# Patient Record
Sex: FEMALE | Race: WHITE | NOT HISPANIC OR LATINO | Employment: OTHER | URBAN - METROPOLITAN AREA
[De-identification: names, ages, dates, MRNs, and addresses within clinical notes are randomized per-mention and may not be internally consistent; named-entity substitution may affect disease eponyms.]

---

## 2021-01-15 DIAGNOSIS — Z23 NEED FOR VACCINATION: ICD-10-CM

## 2024-08-14 ENCOUNTER — APPOINTMENT (OUTPATIENT)
Dept: RADIOLOGY | Facility: MEDICAL CENTER | Age: 74
DRG: 377 | End: 2024-08-14
Attending: STUDENT IN AN ORGANIZED HEALTH CARE EDUCATION/TRAINING PROGRAM
Payer: MEDICARE

## 2024-08-14 ENCOUNTER — HOSPITAL ENCOUNTER (INPATIENT)
Facility: MEDICAL CENTER | Age: 74
LOS: 2 days | DRG: 377 | End: 2024-08-16
Attending: STUDENT IN AN ORGANIZED HEALTH CARE EDUCATION/TRAINING PROGRAM | Admitting: STUDENT IN AN ORGANIZED HEALTH CARE EDUCATION/TRAINING PROGRAM
Payer: MEDICARE

## 2024-08-14 DIAGNOSIS — D64.9 ANEMIA, UNSPECIFIED TYPE: ICD-10-CM

## 2024-08-14 DIAGNOSIS — R11.2 NAUSEA AND VOMITING, UNSPECIFIED VOMITING TYPE: ICD-10-CM

## 2024-08-14 DIAGNOSIS — R19.7 DIARRHEA, UNSPECIFIED TYPE: ICD-10-CM

## 2024-08-14 DIAGNOSIS — K27.9 PUD (PEPTIC ULCER DISEASE): ICD-10-CM

## 2024-08-14 DIAGNOSIS — D50.0 IRON DEFICIENCY ANEMIA DUE TO CHRONIC BLOOD LOSS: Primary | ICD-10-CM

## 2024-08-14 DIAGNOSIS — R10.13 EPIGASTRIC ABDOMINAL PAIN: ICD-10-CM

## 2024-08-14 PROBLEM — E86.0 DEHYDRATION: Status: ACTIVE | Noted: 2024-08-14

## 2024-08-14 PROBLEM — D50.9 IRON DEFICIENCY ANEMIA: Status: ACTIVE | Noted: 2024-08-14

## 2024-08-14 PROBLEM — E87.6 HYPOKALEMIA: Status: ACTIVE | Noted: 2024-08-14

## 2024-08-14 PROBLEM — E43 SEVERE PROTEIN-CALORIE MALNUTRITION (HCC): Status: ACTIVE | Noted: 2024-08-14

## 2024-08-14 PROBLEM — D62 ACUTE BLOOD LOSS ANEMIA: Status: ACTIVE | Noted: 2024-08-14

## 2024-08-14 PROBLEM — K92.2 ACUTE GI BLEEDING: Status: ACTIVE | Noted: 2024-08-14

## 2024-08-14 LAB
ALBUMIN SERPL BCP-MCNC: 3.4 G/DL (ref 3.2–4.9)
ALBUMIN/GLOB SERPL: 1.9 G/DL
ALP SERPL-CCNC: 69 U/L (ref 30–99)
ALT SERPL-CCNC: 16 U/L (ref 2–50)
AMORPH CRY #/AREA URNS HPF: PRESENT /HPF
ANION GAP SERPL CALC-SCNC: 12 MMOL/L (ref 7–16)
ANISOCYTOSIS BLD QL SMEAR: ABNORMAL
APPEARANCE UR: ABNORMAL
AST SERPL-CCNC: 18 U/L (ref 12–45)
BACTERIA #/AREA URNS HPF: NEGATIVE /HPF
BASOPHILS # BLD AUTO: 0.4 % (ref 0–1.8)
BASOPHILS # BLD: 0.04 K/UL (ref 0–0.12)
BILIRUB SERPL-MCNC: 0.4 MG/DL (ref 0.1–1.5)
BILIRUB UR QL STRIP.AUTO: NEGATIVE
BUN SERPL-MCNC: 9 MG/DL (ref 8–22)
CALCIUM ALBUM COR SERPL-MCNC: 9.4 MG/DL (ref 8.5–10.5)
CALCIUM SERPL-MCNC: 8.9 MG/DL (ref 8.5–10.5)
CHLORIDE SERPL-SCNC: 101 MMOL/L (ref 96–112)
CK SERPL-CCNC: 128 U/L (ref 0–154)
CO2 SERPL-SCNC: 27 MMOL/L (ref 20–33)
COLOR UR: YELLOW
COMMENT 1642: NORMAL
CREAT SERPL-MCNC: 0.41 MG/DL (ref 0.5–1.4)
DACRYOCYTES BLD QL SMEAR: NORMAL
EKG IMPRESSION: NORMAL
EKG IMPRESSION: NORMAL
EOSINOPHIL # BLD AUTO: 0 K/UL (ref 0–0.51)
EOSINOPHIL NFR BLD: 0 % (ref 0–6.9)
EPI CELLS #/AREA URNS HPF: NORMAL /HPF
ERYTHROCYTE [DISTWIDTH] IN BLOOD BY AUTOMATED COUNT: 46 FL (ref 35.9–50)
FERRITIN SERPL-MCNC: 18.1 NG/ML (ref 10–291)
FOLATE SERPL-MCNC: 10.3 NG/ML
GFR SERPLBLD CREATININE-BSD FMLA CKD-EPI: 103 ML/MIN/1.73 M 2
GLOBULIN SER CALC-MCNC: 1.8 G/DL (ref 1.9–3.5)
GLUCOSE SERPL-MCNC: 115 MG/DL (ref 65–99)
GLUCOSE UR STRIP.AUTO-MCNC: NEGATIVE MG/DL
HCT VFR BLD AUTO: 28.5 % (ref 37–47)
HGB BLD-MCNC: 8.3 G/DL (ref 12–16)
HGB RETIC QN AUTO: 18.3 PG/CELL (ref 29–35)
HYALINE CASTS #/AREA URNS LPF: NORMAL /LPF
HYPOCHROMIA BLD QL SMEAR: ABNORMAL
IMM GRANULOCYTES # BLD AUTO: 0.03 K/UL (ref 0–0.11)
IMM GRANULOCYTES NFR BLD AUTO: 0.3 % (ref 0–0.9)
IMM RETICS NFR: 14.9 % (ref 2.6–16.1)
INR PPP: 1.1 (ref 0.87–1.13)
IRON SATN MFR SERPL: 6 % (ref 15–55)
IRON SERPL-MCNC: 17 UG/DL (ref 40–170)
KETONES UR STRIP.AUTO-MCNC: 15 MG/DL
LDH SERPL L TO P-CCNC: 332 U/L (ref 107–266)
LEUKOCYTE ESTERASE UR QL STRIP.AUTO: NEGATIVE
LIPASE SERPL-CCNC: 16 U/L (ref 11–82)
LYMPHOCYTES # BLD AUTO: 0.48 K/UL (ref 1–4.8)
LYMPHOCYTES NFR BLD: 5.3 % (ref 22–41)
MAGNESIUM SERPL-MCNC: 2.1 MG/DL (ref 1.5–2.5)
MCH RBC QN AUTO: 23 PG (ref 27–33)
MCHC RBC AUTO-ENTMCNC: 29.1 G/DL (ref 32.2–35.5)
MCV RBC AUTO: 78.9 FL (ref 81.4–97.8)
MICRO URNS: ABNORMAL
MICROCYTES BLD QL SMEAR: ABNORMAL
MONOCYTES # BLD AUTO: 0.74 K/UL (ref 0–0.85)
MONOCYTES NFR BLD AUTO: 8.1 % (ref 0–13.4)
MORPHOLOGY BLD-IMP: NORMAL
MUCOUS THREADS #/AREA URNS HPF: NORMAL /HPF
NEUTROPHILS # BLD AUTO: 7.79 K/UL (ref 1.82–7.42)
NEUTROPHILS NFR BLD: 85.9 % (ref 44–72)
NITRITE UR QL STRIP.AUTO: NEGATIVE
NRBC # BLD AUTO: 0 K/UL
NRBC BLD-RTO: 0 /100 WBC (ref 0–0.2)
OVALOCYTES BLD QL SMEAR: NORMAL
PH UR STRIP.AUTO: 8.5 [PH] (ref 5–8)
PLATELET # BLD AUTO: 393 K/UL (ref 164–446)
PLATELET BLD QL SMEAR: NORMAL
PMV BLD AUTO: 10.1 FL (ref 9–12.9)
POIKILOCYTOSIS BLD QL SMEAR: NORMAL
POTASSIUM SERPL-SCNC: 3.1 MMOL/L (ref 3.6–5.5)
PROT SERPL-MCNC: 5.2 G/DL (ref 6–8.2)
PROT UR QL STRIP: NEGATIVE MG/DL
PROTHROMBIN TIME: 14.3 SEC (ref 12–14.6)
RBC # BLD AUTO: 3.61 M/UL (ref 4.2–5.4)
RBC # URNS HPF: NORMAL /HPF
RBC BLD AUTO: PRESENT
RBC UR QL AUTO: NEGATIVE
RETICS # AUTO: 0.06 M/UL (ref 0.04–0.12)
RETICS/RBC NFR: 1.8 % (ref 0.8–2.6)
SODIUM SERPL-SCNC: 140 MMOL/L (ref 135–145)
SP GR UR STRIP.AUTO: 1.01
STOMATOCYTES BLD QL SMEAR: NORMAL
TIBC SERPL-MCNC: 288 UG/DL (ref 250–450)
TRANSFERRIN SERPL-MCNC: 229 MG/DL (ref 200–370)
TROPONIN T SERPL-MCNC: 45 NG/L (ref 6–19)
TROPONIN T SERPL-MCNC: 48 NG/L (ref 6–19)
UIBC SERPL-MCNC: 271 UG/DL (ref 110–370)
UROBILINOGEN UR STRIP.AUTO-MCNC: 0.2 MG/DL
WBC # BLD AUTO: 9.1 K/UL (ref 4.8–10.8)
WBC #/AREA URNS HPF: NORMAL /HPF
WBC TOXIC VACUOLES BLD QL SMEAR: NORMAL

## 2024-08-14 PROCEDURE — 84484 ASSAY OF TROPONIN QUANT: CPT | Mod: 91

## 2024-08-14 PROCEDURE — 36415 COLL VENOUS BLD VENIPUNCTURE: CPT

## 2024-08-14 PROCEDURE — 82728 ASSAY OF FERRITIN: CPT

## 2024-08-14 PROCEDURE — 700111 HCHG RX REV CODE 636 W/ 250 OVERRIDE (IP): Performed by: STUDENT IN AN ORGANIZED HEALTH CARE EDUCATION/TRAINING PROGRAM

## 2024-08-14 PROCEDURE — 700102 HCHG RX REV CODE 250 W/ 637 OVERRIDE(OP): Mod: JZ | Performed by: STUDENT IN AN ORGANIZED HEALTH CARE EDUCATION/TRAINING PROGRAM

## 2024-08-14 PROCEDURE — 700105 HCHG RX REV CODE 258: Performed by: STUDENT IN AN ORGANIZED HEALTH CARE EDUCATION/TRAINING PROGRAM

## 2024-08-14 PROCEDURE — 93005 ELECTROCARDIOGRAM TRACING: CPT | Performed by: STUDENT IN AN ORGANIZED HEALTH CARE EDUCATION/TRAINING PROGRAM

## 2024-08-14 PROCEDURE — 83550 IRON BINDING TEST: CPT

## 2024-08-14 PROCEDURE — 74177 CT ABD & PELVIS W/CONTRAST: CPT

## 2024-08-14 PROCEDURE — 700102 HCHG RX REV CODE 250 W/ 637 OVERRIDE(OP): Performed by: STUDENT IN AN ORGANIZED HEALTH CARE EDUCATION/TRAINING PROGRAM

## 2024-08-14 PROCEDURE — 85046 RETICYTE/HGB CONCENTRATE: CPT

## 2024-08-14 PROCEDURE — 700117 HCHG RX CONTRAST REV CODE 255: Performed by: STUDENT IN AN ORGANIZED HEALTH CARE EDUCATION/TRAINING PROGRAM

## 2024-08-14 PROCEDURE — 770020 HCHG ROOM/CARE - TELE (206)

## 2024-08-14 PROCEDURE — 85025 COMPLETE CBC W/AUTO DIFF WBC: CPT

## 2024-08-14 PROCEDURE — 83615 LACTATE (LD) (LDH) ENZYME: CPT

## 2024-08-14 PROCEDURE — 96374 THER/PROPH/DIAG INJ IV PUSH: CPT

## 2024-08-14 PROCEDURE — 85610 PROTHROMBIN TIME: CPT

## 2024-08-14 PROCEDURE — 82550 ASSAY OF CK (CPK): CPT

## 2024-08-14 PROCEDURE — 83540 ASSAY OF IRON: CPT

## 2024-08-14 PROCEDURE — 81001 URINALYSIS AUTO W/SCOPE: CPT

## 2024-08-14 PROCEDURE — 99497 ADVNCD CARE PLAN 30 MIN: CPT | Performed by: STUDENT IN AN ORGANIZED HEALTH CARE EDUCATION/TRAINING PROGRAM

## 2024-08-14 PROCEDURE — A9270 NON-COVERED ITEM OR SERVICE: HCPCS | Mod: JZ | Performed by: STUDENT IN AN ORGANIZED HEALTH CARE EDUCATION/TRAINING PROGRAM

## 2024-08-14 PROCEDURE — 83690 ASSAY OF LIPASE: CPT

## 2024-08-14 PROCEDURE — 96375 TX/PRO/DX INJ NEW DRUG ADDON: CPT

## 2024-08-14 PROCEDURE — 82746 ASSAY OF FOLIC ACID SERUM: CPT

## 2024-08-14 PROCEDURE — 84466 ASSAY OF TRANSFERRIN: CPT

## 2024-08-14 PROCEDURE — 83735 ASSAY OF MAGNESIUM: CPT

## 2024-08-14 PROCEDURE — 93005 ELECTROCARDIOGRAM TRACING: CPT

## 2024-08-14 PROCEDURE — 71045 X-RAY EXAM CHEST 1 VIEW: CPT

## 2024-08-14 PROCEDURE — 99223 1ST HOSP IP/OBS HIGH 75: CPT | Mod: 25,AI | Performed by: STUDENT IN AN ORGANIZED HEALTH CARE EDUCATION/TRAINING PROGRAM

## 2024-08-14 PROCEDURE — A9270 NON-COVERED ITEM OR SERVICE: HCPCS | Performed by: STUDENT IN AN ORGANIZED HEALTH CARE EDUCATION/TRAINING PROGRAM

## 2024-08-14 PROCEDURE — 80053 COMPREHEN METABOLIC PANEL: CPT

## 2024-08-14 PROCEDURE — 99285 EMERGENCY DEPT VISIT HI MDM: CPT

## 2024-08-14 RX ORDER — ASPIRIN 325 MG
325 TABLET ORAL ONCE
Status: DISCONTINUED | OUTPATIENT
Start: 2024-08-14 | End: 2024-08-14

## 2024-08-14 RX ORDER — FAMOTIDINE 20 MG/1
20 TABLET, FILM COATED ORAL ONCE
Status: COMPLETED | OUTPATIENT
Start: 2024-08-14 | End: 2024-08-14

## 2024-08-14 RX ORDER — FLUTICASONE PROPIONATE 50 MCG
1 SPRAY, SUSPENSION (ML) NASAL DAILY
COMMUNITY

## 2024-08-14 RX ORDER — POTASSIUM CHLORIDE 1500 MG/1
60 TABLET, EXTENDED RELEASE ORAL ONCE
Status: COMPLETED | OUTPATIENT
Start: 2024-08-14 | End: 2024-08-14

## 2024-08-14 RX ORDER — ONDANSETRON 4 MG/1
4 TABLET, ORALLY DISINTEGRATING ORAL EVERY 4 HOURS PRN
Status: DISCONTINUED | OUTPATIENT
Start: 2024-08-14 | End: 2024-08-16 | Stop reason: HOSPADM

## 2024-08-14 RX ORDER — MAGNESIUM SULFATE 1 G/100ML
1 INJECTION INTRAVENOUS ONCE
Status: COMPLETED | OUTPATIENT
Start: 2024-08-14 | End: 2024-08-15

## 2024-08-14 RX ORDER — ONDANSETRON 4 MG/1
4 TABLET, ORALLY DISINTEGRATING ORAL EVERY 8 HOURS PRN
COMMUNITY

## 2024-08-14 RX ORDER — PANTOPRAZOLE SODIUM 40 MG/1
40 TABLET, DELAYED RELEASE ORAL 2 TIMES DAILY
Status: ON HOLD | COMMUNITY
End: 2024-08-16

## 2024-08-14 RX ORDER — SUCRALFATE ORAL 1 G/10ML
10 SUSPENSION ORAL 4 TIMES DAILY
Status: ON HOLD | COMMUNITY
End: 2024-08-16

## 2024-08-14 RX ORDER — POTASSIUM CHLORIDE 1500 MG/1
40 TABLET, EXTENDED RELEASE ORAL ONCE
Status: COMPLETED | OUTPATIENT
Start: 2024-08-14 | End: 2024-08-14

## 2024-08-14 RX ORDER — ACETAMINOPHEN 325 MG/1
650 TABLET ORAL EVERY 6 HOURS PRN
Status: DISCONTINUED | OUTPATIENT
Start: 2024-08-14 | End: 2024-08-15

## 2024-08-14 RX ORDER — HYDROMORPHONE HYDROCHLORIDE 1 MG/ML
0.5 INJECTION, SOLUTION INTRAMUSCULAR; INTRAVENOUS; SUBCUTANEOUS ONCE
Status: COMPLETED | OUTPATIENT
Start: 2024-08-14 | End: 2024-08-14

## 2024-08-14 RX ORDER — FAMOTIDINE 20 MG/1
20 TABLET, FILM COATED ORAL DAILY
Status: ON HOLD | COMMUNITY
End: 2024-08-16

## 2024-08-14 RX ORDER — SODIUM CHLORIDE, SODIUM LACTATE, POTASSIUM CHLORIDE, AND CALCIUM CHLORIDE .6; .31; .03; .02 G/100ML; G/100ML; G/100ML; G/100ML
500 INJECTION, SOLUTION INTRAVENOUS
Status: DISCONTINUED | OUTPATIENT
Start: 2024-08-14 | End: 2024-08-16 | Stop reason: HOSPADM

## 2024-08-14 RX ORDER — SODIUM CHLORIDE, SODIUM LACTATE, POTASSIUM CHLORIDE, CALCIUM CHLORIDE 600; 310; 30; 20 MG/100ML; MG/100ML; MG/100ML; MG/100ML
INJECTION, SOLUTION INTRAVENOUS CONTINUOUS
Status: DISCONTINUED | OUTPATIENT
Start: 2024-08-14 | End: 2024-08-16 | Stop reason: HOSPADM

## 2024-08-14 RX ORDER — SUCRALFATE ORAL 1 G/10ML
1 SUSPENSION ORAL EVERY 6 HOURS
Status: DISCONTINUED | OUTPATIENT
Start: 2024-08-15 | End: 2024-08-16 | Stop reason: HOSPADM

## 2024-08-14 RX ORDER — SODIUM CHLORIDE, SODIUM LACTATE, POTASSIUM CHLORIDE, CALCIUM CHLORIDE 600; 310; 30; 20 MG/100ML; MG/100ML; MG/100ML; MG/100ML
1000 INJECTION, SOLUTION INTRAVENOUS ONCE
Status: COMPLETED | OUTPATIENT
Start: 2024-08-14 | End: 2024-08-14

## 2024-08-14 RX ORDER — FAMOTIDINE 20 MG/1
20 TABLET, FILM COATED ORAL 2 TIMES DAILY
Status: DISCONTINUED | OUTPATIENT
Start: 2024-08-14 | End: 2024-08-14

## 2024-08-14 RX ORDER — ONDANSETRON 2 MG/ML
4 INJECTION INTRAMUSCULAR; INTRAVENOUS EVERY 4 HOURS PRN
Status: DISCONTINUED | OUTPATIENT
Start: 2024-08-14 | End: 2024-08-16 | Stop reason: HOSPADM

## 2024-08-14 RX ORDER — FOLIC ACID 1 MG/1
1 TABLET ORAL 2 TIMES DAILY
Status: DISCONTINUED | OUTPATIENT
Start: 2024-08-14 | End: 2024-08-16 | Stop reason: HOSPADM

## 2024-08-14 RX ORDER — UREA 10 %
1000 LOTION (ML) TOPICAL DAILY
Status: DISCONTINUED | OUTPATIENT
Start: 2024-08-15 | End: 2024-08-16 | Stop reason: HOSPADM

## 2024-08-14 RX ORDER — PANTOPRAZOLE SODIUM 40 MG/10ML
40 INJECTION, POWDER, LYOPHILIZED, FOR SOLUTION INTRAVENOUS ONCE
Status: COMPLETED | OUTPATIENT
Start: 2024-08-14 | End: 2024-08-14

## 2024-08-14 RX ORDER — LABETALOL HYDROCHLORIDE 5 MG/ML
10 INJECTION, SOLUTION INTRAVENOUS EVERY 4 HOURS PRN
Status: DISCONTINUED | OUTPATIENT
Start: 2024-08-14 | End: 2024-08-16 | Stop reason: HOSPADM

## 2024-08-14 RX ORDER — ONDANSETRON 2 MG/ML
4 INJECTION INTRAMUSCULAR; INTRAVENOUS ONCE
Status: COMPLETED | OUTPATIENT
Start: 2024-08-14 | End: 2024-08-14

## 2024-08-14 RX ADMIN — FENTANYL CITRATE 25 MCG: 50 INJECTION, SOLUTION INTRAMUSCULAR; INTRAVENOUS at 16:34

## 2024-08-14 RX ADMIN — POTASSIUM CHLORIDE 40 MEQ: 1500 TABLET, EXTENDED RELEASE ORAL at 17:28

## 2024-08-14 RX ADMIN — SODIUM CHLORIDE, POTASSIUM CHLORIDE, SODIUM LACTATE AND CALCIUM CHLORIDE: 600; 310; 30; 20 INJECTION, SOLUTION INTRAVENOUS at 20:36

## 2024-08-14 RX ADMIN — FOLIC ACID 1 MG: 1 TABLET ORAL at 23:49

## 2024-08-14 RX ADMIN — PANTOPRAZOLE SODIUM 40 MG: 40 INJECTION, POWDER, FOR SOLUTION INTRAVENOUS at 17:28

## 2024-08-14 RX ADMIN — ONDANSETRON 4 MG: 2 INJECTION INTRAMUSCULAR; INTRAVENOUS at 16:33

## 2024-08-14 RX ADMIN — SODIUM CHLORIDE, POTASSIUM CHLORIDE, SODIUM LACTATE AND CALCIUM CHLORIDE 1000 ML: 600; 310; 30; 20 INJECTION, SOLUTION INTRAVENOUS at 16:34

## 2024-08-14 RX ADMIN — POTASSIUM CHLORIDE 60 MEQ: 1500 TABLET, EXTENDED RELEASE ORAL at 23:49

## 2024-08-14 RX ADMIN — HYDROMORPHONE HYDROCHLORIDE 0.5 MG: 1 INJECTION, SOLUTION INTRAMUSCULAR; INTRAVENOUS; SUBCUTANEOUS at 18:06

## 2024-08-14 RX ADMIN — PANTOPRAZOLE SODIUM 8 MG/HR: 40 INJECTION, POWDER, FOR SOLUTION INTRAVENOUS at 20:37

## 2024-08-14 RX ADMIN — FAMOTIDINE 20 MG: 20 TABLET, FILM COATED ORAL at 16:34

## 2024-08-14 RX ADMIN — IOHEXOL 80 ML: 350 INJECTION, SOLUTION INTRAVENOUS at 19:00

## 2024-08-14 RX ADMIN — SUCRALFATE 1 G: 1 SUSPENSION ORAL at 23:49

## 2024-08-14 SDOH — ECONOMIC STABILITY: TRANSPORTATION INSECURITY
IN THE PAST 12 MONTHS, HAS LACK OF RELIABLE TRANSPORTATION KEPT YOU FROM MEDICAL APPOINTMENTS, MEETINGS, WORK OR FROM GETTING THINGS NEEDED FOR DAILY LIVING?: NO

## 2024-08-14 SDOH — ECONOMIC STABILITY: TRANSPORTATION INSECURITY
IN THE PAST 12 MONTHS, HAS THE LACK OF TRANSPORTATION KEPT YOU FROM MEDICAL APPOINTMENTS OR FROM GETTING MEDICATIONS?: NO

## 2024-08-14 ASSESSMENT — LIFESTYLE VARIABLES
CONSUMPTION TOTAL: NEGATIVE
ON A TYPICAL DAY WHEN YOU DRINK ALCOHOL HOW MANY DRINKS DO YOU HAVE: 0
HOW MANY TIMES IN THE PAST YEAR HAVE YOU HAD 5 OR MORE DRINKS IN A DAY: 0
ALCOHOL_USE: NO
TOTAL SCORE: 0
TOTAL SCORE: 0
SUBSTANCE_ABUSE: 0
TOTAL SCORE: 0
HAVE YOU EVER FELT YOU SHOULD CUT DOWN ON YOUR DRINKING: NO
AVERAGE NUMBER OF DAYS PER WEEK YOU HAVE A DRINK CONTAINING ALCOHOL: 0
EVER HAD A DRINK FIRST THING IN THE MORNING TO STEADY YOUR NERVES TO GET RID OF A HANGOVER: NO
EVER FELT BAD OR GUILTY ABOUT YOUR DRINKING: NO
HAVE PEOPLE ANNOYED YOU BY CRITICIZING YOUR DRINKING: NO

## 2024-08-14 ASSESSMENT — ENCOUNTER SYMPTOMS
CHILLS: 0
ABDOMINAL PAIN: 1
NAUSEA: 1
SHORTNESS OF BREATH: 0
FOCAL WEAKNESS: 1
MYALGIAS: 0
VOMITING: 1
BRUISES/BLEEDS EASILY: 1
DEPRESSION: 0
BACK PAIN: 0
PALPITATIONS: 0
DOUBLE VISION: 0
BLURRED VISION: 0
BLOOD IN STOOL: 1
HEARTBURN: 1
FEVER: 0
HEADACHES: 0
COUGH: 0
DIZZINESS: 0

## 2024-08-14 ASSESSMENT — COGNITIVE AND FUNCTIONAL STATUS - GENERAL
MOBILITY SCORE: 22
CLIMB 3 TO 5 STEPS WITH RAILING: A LITTLE
DAILY ACTIVITIY SCORE: 23
DRESSING REGULAR LOWER BODY CLOTHING: A LITTLE
SUGGESTED CMS G CODE MODIFIER MOBILITY: CJ
SUGGESTED CMS G CODE MODIFIER DAILY ACTIVITY: CI
WALKING IN HOSPITAL ROOM: A LITTLE

## 2024-08-14 ASSESSMENT — SOCIAL DETERMINANTS OF HEALTH (SDOH)
WITHIN THE PAST 12 MONTHS, YOU WORRIED THAT YOUR FOOD WOULD RUN OUT BEFORE YOU GOT THE MONEY TO BUY MORE: NEVER TRUE
WITHIN THE LAST YEAR, HAVE YOU BEEN KICKED, HIT, SLAPPED, OR OTHERWISE PHYSICALLY HURT BY YOUR PARTNER OR EX-PARTNER?: NO
WITHIN THE LAST YEAR, HAVE YOU BEEN AFRAID OF YOUR PARTNER OR EX-PARTNER?: NO
WITHIN THE PAST 12 MONTHS, THE FOOD YOU BOUGHT JUST DIDN'T LAST AND YOU DIDN'T HAVE MONEY TO GET MORE: NEVER TRUE
WITHIN THE LAST YEAR, HAVE TO BEEN RAPED OR FORCED TO HAVE ANY KIND OF SEXUAL ACTIVITY BY YOUR PARTNER OR EX-PARTNER?: NO
IN THE PAST 12 MONTHS, HAS THE ELECTRIC, GAS, OIL, OR WATER COMPANY THREATENED TO SHUT OFF SERVICE IN YOUR HOME?: NO
WITHIN THE LAST YEAR, HAVE YOU BEEN HUMILIATED OR EMOTIONALLY ABUSED IN OTHER WAYS BY YOUR PARTNER OR EX-PARTNER?: NO

## 2024-08-14 ASSESSMENT — FIBROSIS 4 INDEX: FIB4 SCORE: 0.84

## 2024-08-14 ASSESSMENT — PATIENT HEALTH QUESTIONNAIRE - PHQ9
SUM OF ALL RESPONSES TO PHQ9 QUESTIONS 1 AND 2: 0
2. FEELING DOWN, DEPRESSED, IRRITABLE, OR HOPELESS: NOT AT ALL
1. LITTLE INTEREST OR PLEASURE IN DOING THINGS: NOT AT ALL

## 2024-08-14 NOTE — ED PROVIDER NOTES
ED Provider Note    CHIEF COMPLAINT  Chief Complaint   Patient presents with    Abdominal Pain    Vomiting       EXTERNAL RECORDS REVIEWED  Outpatient Notes patient was seen in the emergency department at [facility] yesterday for abdominal bloating and epigastric abdominal pain nausea and vomiting.  She had a laboratory workup remarkable for baseline anemia, hypokalemia but unremarkable LFTs and lipase and was ultimately discharged.  An abdominal x-ray demonstrated no evidence of obstruction or intra-abdominal free air.  Did not appear she was complaining of chest pain at that time.    HPI/ROS  LIMITATION TO HISTORY   Select: : None      Penelope Prince is a 73 y.o. female who presents to the emergency department for evaluation of ongoing epigastric abdominal pain also in her right upper quadrant which has persisted for the last week, steadily worsening.  She states she underwent an EGD 1 week ago which showed a persistent ulcer in her stomach.  She states she has taken her medications as prescribed after the procedure.  She reports that the pain has persisted and worsened since yesterday and is now migrating into her chest described as a burning sensation.  She reports nausea and vomiting.  She reports constipation and has not had a bowel movement in the last 24 hours.  She is passing gas.  She denies hematemesis or blood in her stool.  She does not take a blood thinner.    PAST MEDICAL HISTORY   has a past medical history of Allergy, Arthritis, GERD (gastroesophageal reflux disease), and Hematuria.    SURGICAL HISTORY   has a past surgical history that includes cataract extraction with iol and ganglion excision (10/19/2009).    FAMILY HISTORY  Family History   Problem Relation Age of Onset    Heart Disease Father     Diabetes Other     Cancer Maternal Grandmother     Cancer Maternal Grandfather     Heart Disease Paternal Grandmother     Heart Disease Paternal Grandfather     Lung Disease  Sister        SOCIAL HISTORY  Social History     Tobacco Use    Smoking status: Never    Smokeless tobacco: Never   Substance and Sexual Activity    Alcohol use: No     Comment: rare    Drug use: No    Sexual activity: Not on file       CURRENT MEDICATIONS  Home Medications       Reviewed by Bri Bishop R.N. (Registered Nurse) on 08/14/24 at 1527  Med List Status: Not Addressed     Medication Last Dose Status   EXCEDRIN 250-250-65 MG TABS  Active   hydrocodone-acetaminophen (NORCO) 5-325 MG TABS per tablet  Active   Omeprazole (EQL OMEPRAZOLE) 20 MG TBEC  Active                    ALLERGIES  Allergies   Allergen Reactions    Aleve [Prenat W-O Y-Je-Nbevite-Fa-Dha]      drowsiness    Food      Banana, onion, carrot       PHYSICAL EXAM  VITAL SIGNS: /57   Pulse 80   Temp 36.8 °C (98.3 °F) (Temporal)   Resp 18   Ht 1.524 m (5')   Wt 43.5 kg (96 lb)   SpO2 95%   BMI 18.75 kg/m²    Constitutional: Anxious, uncomfortable appearing clutching abdomen  HEENT: Atraumatic, normocephalic, pupils are equal round reactive to light, nose normal, mouth shows m dry oist mucous membranes  Neck: Supple, no JVD, no tracheal deviation  Cardiovascular: Regular rate and rhythm, no murmur, rub or gallop, 2+ pulses peripherally x4  Thorax & Lungs: No respiratory distress, no wheezes, rales or rhonchi, no chest wall tenderness.  GI: Soft, epigastric and right upper quadrant tenderness with guarding  Skin: Warm, dry, no acute rash or lesion  Musculoskeletal: Moving all extremities, no acute deformity, no edema, no tenderness  Neurologic: A&Ox3, at baseline mentation, cranial nerves II through XII are grossly intact, no sensory deficit, no ataxia  Psychiatric: Appropriate affect for situation at this time          EKG/LABS  Labs Reviewed   URINALYSIS - Abnormal; Notable for the following components:       Result Value    Character Turbid (*)     Ph 8.5 (*)     Ketones 15 (*)     All other components within normal limits   CBC  WITH DIFFERENTIAL - Abnormal; Notable for the following components:    RBC 3.61 (*)     Hemoglobin 8.3 (*)     Hematocrit 28.5 (*)     MCV 78.9 (*)     MCH 23.0 (*)     MCHC 29.1 (*)     Neutrophils-Polys 85.90 (*)     Lymphocytes 5.30 (*)     Neutrophils (Absolute) 7.79 (*)     Lymphs (Absolute) 0.48 (*)     Microcytosis 2+ (*)     All other components within normal limits   COMP METABOLIC PANEL - Abnormal; Notable for the following components:    Potassium 3.1 (*)     Glucose 115 (*)     Creatinine 0.41 (*)     Total Protein 5.2 (*)     Globulin 1.8 (*)     All other components within normal limits   TROPONIN - Abnormal; Notable for the following components:    Troponin T 48 (*)     All other components within normal limits   LIPASE   URINE MICROSCOPIC (W/UA)   ESTIMATED GFR   PLATELET ESTIMATE   MORPHOLOGY   PERIPHERAL SMEAR REVIEW   DIFFERENTIAL COMMENT   TROPONIN     Results for orders placed or performed during the hospital encounter of 24   EKG   Result Value Ref Range    Report       [facility] Emergency Dept.    Test Date:  2024  Pt Name:    HORACE MERCADO                 Department: ER  MRN:        [id number]                      Room:       BL 21 H  Gender:     Female                       Technician: [id number]  :        [id number]                   Requested By:ER TRIAGE PROTOCOL  Order #:    [id number]                    Reading MD: Vamshi Braswell    Measurements  Intervals                                Axis  Rate:       71                           P:          45  WI:         109                          QRS:        22  QRSD:       106                          T:          33  QT:         398  QTc:        433    Interpretive Statements  Sinus rhythm  Short WI interval  Low voltage, extremity leads  Compared to ECG 10/15/2009 10:38:43  Short WI interval now present  Electronically Signed On 2024 16:10:34 PDT by Vamshi Braswell         I have independently interpreted this  EKG    RADIOLOGY/PROCEDURES   I have independently interpreted the diagnostic imaging associated with this visit and am waiting the final reading from the radiologist.   My preliminary interpretation is as follows: CT scan with no intra-abdominal free air or evidence of bowel obstruction    Radiologist interpretation:  CT-ABDOMEN-PELVIS WITH   Final Result      Abnormal mucosal enhancement and probable wall thickening of the gastric antrum and proximal duodenum with possible outpouching/ulcers suggesting gastroduodenitis/peptic ulcer disease. Correlate with endoscopy.      No definite free air      Colonic diverticulosis.      DX-CHEST-PORTABLE (1 VIEW)   Final Result      No acute cardiac or pulmonary abnormalities are identified.          COURSE & MEDICAL DECISION MAKING    ASSESSMENT, COURSE AND PLAN  Care Narrative:     Patient presents to the emergency department for evaluation of ongoing epigastric and right upper quadrant abdominal pain, constipation and now migration of her pain to her chest.  She describes it as a burning discomfort in her chest that is nonexertional in nature.  Does not sound typical of ACS however will screen for such with EKG and troponin.  I am chiefly concerned for development of bowel perforation given her recent instrumentation and known peptic ulcer disease however she is nonperitoneal.  Given her age, persistence of her symptoms, bounce back within 24 hours to the emergency department we will proceed with CT scan abdomen pelvis to further assess.  Will repeat laboratory workup to screen for any worsening anemia, significant electrolyte abnormality given her hypokalemia found yesterday, kidney or liver dysfunction.  Patient's troponin initially was found to be slightly elevated though on recheck she denies any ongoing chest pain.  Recheck demonstrates a downtrend and serial EKGs were obtained with no acute ischemic findings or changes.  Suspect demand ischemia from poor p.o. intake  and dehydration and at this point will not initiate aspirin or heparin therapy given concern for bleeding with worsening anemia today including a 1 point hemoglobin drop from laboratory assay yesterday.  CT scan ultimately without evidence of bowel perforation, obstruction or intra-abdominal abscess though does demonstrate findings suggestive of peptic ulcer disease.  Given the significance of her pain, hemoglobin drop and concern for active bleeding will plan for admission to the hospital for consideration of repeat EGD.  Case discussed with Dr. Giang, hospitalist who accepts patient for admission        Hydration: Based on the patient's presentation of Acute Vomiting and Dehydration the patient was given IV fluids. IV Hydration was used because oral hydration was not adequate alone. Upon recheck following hydration, the patient was improved.          ADDITIONAL PROBLEMS MANAGED  None    DISPOSITION AND DISCUSSIONS  I have discussed management of the patient with the following physicians and FRANKY's: Hospitalist as above    Decision tools and prescription drugs considered including, but not limited to: Pain Medications Pepcid, Dilaudid, fentanyl .    FINAL DIAGNOSIS  1. Nausea and vomiting, unspecified vomiting type    2. Epigastric abdominal pain    3. Anemia, unspecified type         Electronically signed by: Vamshi Braswell M.D., 8/14/2024 4:05 PM

## 2024-08-14 NOTE — ED TRIAGE NOTES
"Penelope Prince  73 y.o. female    Chief Complaint   Patient presents with    Abdominal Pain    Vomiting     Pt arrives with complaints of generalized abd pain and L sided belly pain that began after pt had endoscopy on 8/7 for ulcer. Pt states she started vomiting today. Denies blood in vomit. Last BM yesterday- reports it is \"hard like marbles.\" Denies fevers. Pt reports pain is radiating up to chest    Pt went to [facility] last night for eval.     Pt states that she wants to die. Reports she started feeling suicidal last night but denies plan  Vitals:    08/14/24 1512   BP: 109/57   Pulse: 80   Resp: 18   Temp: 36.8 °C (98.3 °F)   SpO2: 95%       Triage process explained to patient, apologized for wait time, and returned to lobby.  Pt informed to notify staff of any change in condition.     "

## 2024-08-15 PROBLEM — D50.0 IRON DEFICIENCY ANEMIA DUE TO CHRONIC BLOOD LOSS: Status: ACTIVE | Noted: 2024-08-14

## 2024-08-15 LAB
ALBUMIN SERPL BCP-MCNC: 3 G/DL (ref 3.2–4.9)
ALBUMIN/GLOB SERPL: 2 G/DL
ALP SERPL-CCNC: 63 U/L (ref 30–99)
ALT SERPL-CCNC: 12 U/L (ref 2–50)
ANION GAP SERPL CALC-SCNC: 6 MMOL/L (ref 7–16)
AST SERPL-CCNC: 13 U/L (ref 12–45)
BASOPHILS # BLD AUTO: 0.5 % (ref 0–1.8)
BASOPHILS # BLD: 0.04 K/UL (ref 0–0.12)
BILIRUB SERPL-MCNC: 0.3 MG/DL (ref 0.1–1.5)
BUN SERPL-MCNC: 6 MG/DL (ref 8–22)
C DIFF DNA SPEC QL NAA+PROBE: NEGATIVE
C DIFF TOX GENS STL QL NAA+PROBE: NEGATIVE
CALCIUM ALBUM COR SERPL-MCNC: 8.5 MG/DL (ref 8.5–10.5)
CALCIUM SERPL-MCNC: 7.7 MG/DL (ref 8.5–10.5)
CHLORIDE SERPL-SCNC: 104 MMOL/L (ref 96–112)
CO2 SERPL-SCNC: 24 MMOL/L (ref 20–33)
CREAT SERPL-MCNC: 0.38 MG/DL (ref 0.5–1.4)
EOSINOPHIL # BLD AUTO: 0.03 K/UL (ref 0–0.51)
EOSINOPHIL NFR BLD: 0.4 % (ref 0–6.9)
ERYTHROCYTE [DISTWIDTH] IN BLOOD BY AUTOMATED COUNT: 46.1 FL (ref 35.9–50)
GFR SERPLBLD CREATININE-BSD FMLA CKD-EPI: 105 ML/MIN/1.73 M 2
GLOBULIN SER CALC-MCNC: 1.5 G/DL (ref 1.9–3.5)
GLUCOSE SERPL-MCNC: 121 MG/DL (ref 65–99)
HCT VFR BLD AUTO: 24.9 % (ref 37–47)
HCT VFR BLD AUTO: 25.1 % (ref 37–47)
HCT VFR BLD AUTO: 26 % (ref 37–47)
HEMOCCULT STL QL: POSITIVE
HGB BLD-MCNC: 7.1 G/DL (ref 12–16)
HGB BLD-MCNC: 7.2 G/DL (ref 12–16)
HGB BLD-MCNC: 7.3 G/DL (ref 12–16)
HGB RETIC QN AUTO: 17.3 PG/CELL (ref 29–35)
IMM GRANULOCYTES # BLD AUTO: 0.03 K/UL (ref 0–0.11)
IMM GRANULOCYTES NFR BLD AUTO: 0.4 % (ref 0–0.9)
IMM RETICS NFR: 13.8 % (ref 2.6–16.1)
IRON SATN MFR SERPL: 6 % (ref 15–55)
IRON SERPL-MCNC: 13 UG/DL (ref 40–170)
LYMPHOCYTES # BLD AUTO: 0.76 K/UL (ref 1–4.8)
LYMPHOCYTES NFR BLD: 10.1 % (ref 22–41)
MAGNESIUM SERPL-MCNC: 2.4 MG/DL (ref 1.5–2.5)
MCH RBC QN AUTO: 22.9 PG (ref 27–33)
MCHC RBC AUTO-ENTMCNC: 28.7 G/DL (ref 32.2–35.5)
MCV RBC AUTO: 79.7 FL (ref 81.4–97.8)
MONOCYTES # BLD AUTO: 0.57 K/UL (ref 0–0.85)
MONOCYTES NFR BLD AUTO: 7.6 % (ref 0–13.4)
NEUTROPHILS # BLD AUTO: 6.07 K/UL (ref 1.82–7.42)
NEUTROPHILS NFR BLD: 81 % (ref 44–72)
NRBC # BLD AUTO: 0 K/UL
NRBC BLD-RTO: 0 /100 WBC (ref 0–0.2)
PHOSPHATE SERPL-MCNC: 3 MG/DL (ref 2.5–4.5)
PLATELET # BLD AUTO: 347 K/UL (ref 164–446)
PMV BLD AUTO: 10.6 FL (ref 9–12.9)
POTASSIUM SERPL-SCNC: 3.7 MMOL/L (ref 3.6–5.5)
PROT SERPL-MCNC: 4.5 G/DL (ref 6–8.2)
RBC # BLD AUTO: 3.15 M/UL (ref 4.2–5.4)
RETICS # AUTO: 0.06 M/UL (ref 0.04–0.12)
RETICS/RBC NFR: 1.8 % (ref 0.8–2.6)
SODIUM SERPL-SCNC: 134 MMOL/L (ref 135–145)
TIBC SERPL-MCNC: 224 UG/DL (ref 250–450)
UIBC SERPL-MCNC: 211 UG/DL (ref 110–370)
VIT B12 SERPL-MCNC: 368 PG/ML (ref 211–911)
VIT B12 SERPL-MCNC: 671 PG/ML (ref 211–911)
VIT B12 SERPL-MCNC: 727 PG/ML (ref 211–911)
WBC # BLD AUTO: 7.5 K/UL (ref 4.8–10.8)

## 2024-08-15 PROCEDURE — 85014 HEMATOCRIT: CPT

## 2024-08-15 PROCEDURE — 36415 COLL VENOUS BLD VENIPUNCTURE: CPT

## 2024-08-15 PROCEDURE — A9270 NON-COVERED ITEM OR SERVICE: HCPCS | Mod: JZ | Performed by: STUDENT IN AN ORGANIZED HEALTH CARE EDUCATION/TRAINING PROGRAM

## 2024-08-15 PROCEDURE — 82272 OCCULT BLD FECES 1-3 TESTS: CPT

## 2024-08-15 PROCEDURE — 700111 HCHG RX REV CODE 636 W/ 250 OVERRIDE (IP): Performed by: STUDENT IN AN ORGANIZED HEALTH CARE EDUCATION/TRAINING PROGRAM

## 2024-08-15 PROCEDURE — 700105 HCHG RX REV CODE 258: Performed by: STUDENT IN AN ORGANIZED HEALTH CARE EDUCATION/TRAINING PROGRAM

## 2024-08-15 PROCEDURE — 80053 COMPREHEN METABOLIC PANEL: CPT

## 2024-08-15 PROCEDURE — 87493 C DIFF AMPLIFIED PROBE: CPT

## 2024-08-15 PROCEDURE — 82607 VITAMIN B-12: CPT | Mod: 91

## 2024-08-15 PROCEDURE — 84100 ASSAY OF PHOSPHORUS: CPT

## 2024-08-15 PROCEDURE — 85046 RETICYTE/HGB CONCENTRATE: CPT

## 2024-08-15 PROCEDURE — 99233 SBSQ HOSP IP/OBS HIGH 50: CPT | Performed by: STUDENT IN AN ORGANIZED HEALTH CARE EDUCATION/TRAINING PROGRAM

## 2024-08-15 PROCEDURE — 85025 COMPLETE CBC W/AUTO DIFF WBC: CPT

## 2024-08-15 PROCEDURE — 770020 HCHG ROOM/CARE - TELE (206)

## 2024-08-15 PROCEDURE — 700102 HCHG RX REV CODE 250 W/ 637 OVERRIDE(OP): Performed by: STUDENT IN AN ORGANIZED HEALTH CARE EDUCATION/TRAINING PROGRAM

## 2024-08-15 PROCEDURE — A9270 NON-COVERED ITEM OR SERVICE: HCPCS | Performed by: STUDENT IN AN ORGANIZED HEALTH CARE EDUCATION/TRAINING PROGRAM

## 2024-08-15 PROCEDURE — 83010 ASSAY OF HAPTOGLOBIN QUANT: CPT

## 2024-08-15 PROCEDURE — 85018 HEMOGLOBIN: CPT

## 2024-08-15 PROCEDURE — 700102 HCHG RX REV CODE 250 W/ 637 OVERRIDE(OP): Mod: JZ | Performed by: STUDENT IN AN ORGANIZED HEALTH CARE EDUCATION/TRAINING PROGRAM

## 2024-08-15 PROCEDURE — 99222 1ST HOSP IP/OBS MODERATE 55: CPT | Mod: 25 | Performed by: INTERNAL MEDICINE

## 2024-08-15 PROCEDURE — 83550 IRON BINDING TEST: CPT

## 2024-08-15 PROCEDURE — 83735 ASSAY OF MAGNESIUM: CPT

## 2024-08-15 PROCEDURE — 83540 ASSAY OF IRON: CPT

## 2024-08-15 RX ORDER — ACETAMINOPHEN 500 MG
1000 TABLET ORAL EVERY 6 HOURS
Status: DISCONTINUED | OUTPATIENT
Start: 2024-08-15 | End: 2024-08-16 | Stop reason: HOSPADM

## 2024-08-15 RX ORDER — LOPERAMIDE HCL 2 MG
2 CAPSULE ORAL 4 TIMES DAILY PRN
Status: DISCONTINUED | OUTPATIENT
Start: 2024-08-15 | End: 2024-08-16 | Stop reason: HOSPADM

## 2024-08-15 RX ORDER — OXYCODONE HYDROCHLORIDE 5 MG/1
5 TABLET ORAL
Status: DISCONTINUED | OUTPATIENT
Start: 2024-08-15 | End: 2024-08-16 | Stop reason: HOSPADM

## 2024-08-15 RX ORDER — MORPHINE SULFATE 4 MG/ML
2 INJECTION INTRAVENOUS
Status: DISCONTINUED | OUTPATIENT
Start: 2024-08-15 | End: 2024-08-16 | Stop reason: HOSPADM

## 2024-08-15 RX ORDER — ACETAMINOPHEN 325 MG/1
650 TABLET ORAL EVERY 6 HOURS PRN
Status: DISCONTINUED | OUTPATIENT
Start: 2024-08-15 | End: 2024-08-16 | Stop reason: HOSPADM

## 2024-08-15 RX ORDER — OXYCODONE HYDROCHLORIDE 5 MG/1
2.5 TABLET ORAL
Status: DISCONTINUED | OUTPATIENT
Start: 2024-08-15 | End: 2024-08-16 | Stop reason: HOSPADM

## 2024-08-15 RX ORDER — POTASSIUM CHLORIDE 1500 MG/1
40 TABLET, EXTENDED RELEASE ORAL ONCE
Status: COMPLETED | OUTPATIENT
Start: 2024-08-15 | End: 2024-08-15

## 2024-08-15 RX ORDER — ACETAMINOPHEN 500 MG
1000 TABLET ORAL EVERY 6 HOURS PRN
Status: DISCONTINUED | OUTPATIENT
Start: 2024-08-20 | End: 2024-08-16 | Stop reason: HOSPADM

## 2024-08-15 RX ORDER — PANTOPRAZOLE SODIUM 40 MG/10ML
40 INJECTION, POWDER, LYOPHILIZED, FOR SOLUTION INTRAVENOUS 2 TIMES DAILY
Status: DISCONTINUED | OUTPATIENT
Start: 2024-08-15 | End: 2024-08-16 | Stop reason: HOSPADM

## 2024-08-15 RX ADMIN — SUCRALFATE 1 G: 1 SUSPENSION ORAL at 05:05

## 2024-08-15 RX ADMIN — SUCRALFATE 1 G: 1 SUSPENSION ORAL at 12:00

## 2024-08-15 RX ADMIN — MAGNESIUM SULFATE IN DEXTROSE 1 G: 10 INJECTION, SOLUTION INTRAVENOUS at 00:03

## 2024-08-15 RX ADMIN — FOLIC ACID 1 MG: 1 TABLET ORAL at 05:05

## 2024-08-15 RX ADMIN — ACETAMINOPHEN 650 MG: 325 TABLET ORAL at 08:52

## 2024-08-15 RX ADMIN — PANTOPRAZOLE SODIUM 40 MG: 40 INJECTION, POWDER, FOR SOLUTION INTRAVENOUS at 08:39

## 2024-08-15 RX ADMIN — LOPERAMIDE HYDROCHLORIDE 2 MG: 2 CAPSULE ORAL at 13:46

## 2024-08-15 RX ADMIN — PANTOPRAZOLE SODIUM 8 MG/HR: 40 INJECTION, POWDER, FOR SOLUTION INTRAVENOUS at 05:48

## 2024-08-15 RX ADMIN — ACETAMINOPHEN 1000 MG: 500 TABLET ORAL at 17:44

## 2024-08-15 RX ADMIN — SUCRALFATE 1 G: 1 SUSPENSION ORAL at 17:44

## 2024-08-15 RX ADMIN — FOLIC ACID 1 MG: 1 TABLET ORAL at 17:44

## 2024-08-15 RX ADMIN — SODIUM CHLORIDE, POTASSIUM CHLORIDE, SODIUM LACTATE AND CALCIUM CHLORIDE: 600; 310; 30; 20 INJECTION, SOLUTION INTRAVENOUS at 05:49

## 2024-08-15 RX ADMIN — PANTOPRAZOLE SODIUM 40 MG: 40 INJECTION, POWDER, FOR SOLUTION INTRAVENOUS at 17:45

## 2024-08-15 RX ADMIN — POTASSIUM CHLORIDE 40 MEQ: 1500 TABLET, EXTENDED RELEASE ORAL at 08:39

## 2024-08-15 RX ADMIN — SODIUM CHLORIDE, POTASSIUM CHLORIDE, SODIUM LACTATE AND CALCIUM CHLORIDE: 600; 310; 30; 20 INJECTION, SOLUTION INTRAVENOUS at 18:33

## 2024-08-15 RX ADMIN — SODIUM CHLORIDE 250 MG: 9 INJECTION, SOLUTION INTRAVENOUS at 21:04

## 2024-08-15 RX ADMIN — CYANOCOBALAMIN TAB 500 MCG 1000 MCG: 500 TAB at 05:05

## 2024-08-15 RX ADMIN — ACETAMINOPHEN 1000 MG: 500 TABLET ORAL at 15:10

## 2024-08-15 ASSESSMENT — ENCOUNTER SYMPTOMS
WEIGHT LOSS: 1
RESPIRATORY NEGATIVE: 1
EYES NEGATIVE: 1
FEVER: 0
CARDIOVASCULAR NEGATIVE: 1
MUSCULOSKELETAL NEGATIVE: 1
VOMITING: 1
COUGH: 0
DIZZINESS: 0
CHILLS: 0
ABDOMINAL PAIN: 1
DIARRHEA: 1
NEUROLOGICAL NEGATIVE: 1
BLOOD IN STOOL: 0
HEADACHES: 0
SHORTNESS OF BREATH: 0
NAUSEA: 0
VOMITING: 0
PALPITATIONS: 0
MYALGIAS: 0

## 2024-08-15 ASSESSMENT — PAIN DESCRIPTION - PAIN TYPE
TYPE: ACUTE PAIN
TYPE: ACUTE PAIN

## 2024-08-15 ASSESSMENT — FIBROSIS 4 INDEX: FIB4 SCORE: 0.79

## 2024-08-15 NOTE — PROGRESS NOTES
4 Eyes Skin Assessment Completed by ANALI Ramirez and ANALI Escobedo.    Head WDL  Ears WDL  Nose WDL  Mouth WDL  Neck WDL  Breast/Chest WDL  Shoulder Blades WDL  Spine WDL  (R) Arm/Elbow/Hand Bruising, skin tear  (L) Arm/Elbow/Hand Bruising  Abdomen WDL  Groin WDL  Scrotum/Coccyx/Buttocks WDL  (R) Leg Scab and Bruising  (L) Leg WDL  (R) Heel/Foot/Toe WDL  (L) Heel/Foot/Toe WDL          Devices In Places Tele Box and Pulse Ox      Interventions In Place Pillows    Possible Skin Injury No    Pictures Uploaded Into Epic Yes  Wound Consult Placed N/A  RN Wound Prevention Protocol Ordered Yes

## 2024-08-15 NOTE — CARE PLAN
The patient is Stable - Low risk of patient condition declining or worsening    Shift Goals  Clinical Goals: (P) monitor for s/s of bleeds, labs, VS  Patient Goals: (P) comfort, rest, pain relief  Family Goals: (P) updates    Progress made toward(s) clinical / shift goals: Stool collected for occult blood, which was positive. Continuing to monitor H&H and to notify MD when less than seven.       Problem: Pain - Standard  Goal: Alleviation of pain or a reduction in pain to the patient’s comfort goal  Outcome: Progressing     Problem: Knowledge Deficit - Standard  Goal: Patient and family/care givers will demonstrate understanding of plan of care, disease process/condition, diagnostic tests and medications  Outcome: Progressing     Problem: Psychosocial  Goal: Patient's ability to identify and develop effective coping behaviors will improve  Outcome: Progressing     Problem: Hemodynamics  Goal: Patient's hemodynamics, fluid balance and neurologic status will be stable or improve  Outcome: Progressing     Problem: Fluid Volume  Goal: Fluid volume balance will be maintained  Outcome: Progressing     Problem: Urinary - Renal Perfusion  Goal: Ability to achieve and maintain adequate renal perfusion and functioning will improve  Outcome: Progressing     Problem: Respiratory  Goal: Patient will achieve/maintain optimum respiratory ventilation and gas exchange  Outcome: Progressing     Problem: Fall Risk  Goal: Patient will remain free from falls  Outcome: Progressing

## 2024-08-15 NOTE — DIETARY
Nutrition services: Day 1 of admit.  Penelope Prince is a 73 y.o. female with admitting DX of Acute GIB.     Pt seen for low BMI and poor PO/wt loss per admit screen, malnutrition screening tool score of 2. RD unable to see pt at this time for interview/nutrition focused physical exam and intervention limited due to clear liquid diet.        Assessment:  Height: 152.4 cm (5')  Weight: 38 kg (83 lb 12.4 oz)  Body mass index is 16.36 kg/m²., BMI classification: Underweight.   Diet/Intake: Clear Liquids.     Evaluation:   Pertinent Labs: Na+ 134.   Pertinent Meds: Vit B12, Folic Acid, LR @ 83 ml/hr, Imodium, Zofran PRN.   Per chart review: No recent wts to assess trends.   Skin: skin tear to right upper arm, no pressure ulcers noted.   GI consult pending.       Malnutrition Risk: Limited information at this time.     Recommendations/Plan:  Will add Ensure clear liquid supplement drink to help optimize PO intake while on clear liquids, changing to Ensure Plus as appropriate with diet advancement.    Encourage intake of ~ 50% or greater of meals.   Document intake of all meals,snacks, supplements  as % taken in ADL's to provide interdisciplinary communication across all shifts.   Monitor weight.  Nutrition rep will continue to see patient for ongoing meal and snack preferences.     RD to follow for adequate intake and interview as feasible.

## 2024-08-15 NOTE — PROGRESS NOTES
[facility] Medicine Daily Progress Note    Date of Service  8/15/2024    Chief Complaint  Penelope Prince is a 73 y.o. female admitted 8/14/2024 with Abdominal pain and vomiting.    Hospital Course  Penelope Prince is a 73 y.o. female who presented on 8/14/2024 with abdominal pain and hematemesis.  This is a pleasant woman with a history of peptic ulcer disease, GERD, and arthritis, who recently underwent endoscopy by [facility] on 8/7/2024, after which the patient bloating.  She presented to [facility] yesterday and was noted to have a hemoglobin of 9 and was subsequently discharged to home.  She returned to [facility] emergency room due to ongoing abdominal discomfort and bloating.  Patient reported taking her pantoprazole following her last endoscopy studies with ongoing bloating symptoms and stomach discomfort.    In the emergency room, repeat hemoglobin was 8.3.  CT scan of the abdomen and pelvis noted abnormal mucosal enhancement with probable wall thickening of gastric antral and proximal duodenum with possible outpouching/ulcer suggestive of gastroduodenitis/peptic ulcer disease.    Patient was started on intravenous pantoprazole and admitted to the telemetry floor for further care evaluation.    Interval Problem Update  8/15/2024  Patient was seen and examined on telemetry floor.  Continues on continuous cardiac monitoring.  Hemoglobin dropping to 7.1.  Bedside nursing reporting some melena.  Patient denying any active hematemesis.  Continue on IV pantoprazole as well as sucralfate..  Potassium of 3.7 being replaced.  Ongoing diarrhea.  C. difficile testing negative.  Started on Imodium.  Gastroenterology consulted and planning for repeat endoscopy studies.  Started on scheduled Tylenol as well as oxycodone and IV morphine as needed for breakthrough pain.  She did receive intravenous fentanyl and hydromorphone upon admission.    I have  discussed this patient's plan of care and discharge plan at IDT rounds today with Case Management, Nursing, Nursing leadership, and other members of the IDT team.    Consultants/Specialty  GI    Code Status  Full Code    Disposition  The patient is not medically cleared for discharge to home or a post-acute facility.  Anticipate discharge to: skilled nursing facility    I have placed the appropriate orders for post-discharge needs.    Review of Systems  Review of Systems   Constitutional:  Negative for chills and fever.   Respiratory:  Negative for cough and shortness of breath.    Cardiovascular:  Negative for chest pain and palpitations.   Gastrointestinal:  Positive for abdominal pain, diarrhea and melena. Negative for blood in stool, nausea and vomiting.   Musculoskeletal:  Negative for joint pain and myalgias.   Neurological:  Negative for dizziness and headaches.        Physical Exam  Temp:  [36.5 °C (97.7 °F)-36.9 °C (98.4 °F)] 36.5 °C (97.7 °F)  Pulse:  [58-80] 58  Resp:  [14-19] 18  BP: (105-144)/() 105/56  SpO2:  [85 %-100 %] 100 %    Physical Exam  Vitals and nursing note reviewed.   Constitutional:       Appearance: She is normal weight. She is ill-appearing. She is not diaphoretic.   HENT:      Head: Normocephalic and atraumatic.      Comments: Bitemporal wasting     Mouth/Throat:      Mouth: Mucous membranes are moist.      Pharynx: Oropharynx is clear. No oropharyngeal exudate.   Eyes:      General:         Right eye: No discharge.         Left eye: No discharge.      Conjunctiva/sclera: Conjunctivae normal.      Pupils: Pupils are equal, round, and reactive to light.   Cardiovascular:      Rate and Rhythm: Normal rate and regular rhythm.      Pulses: Normal pulses.      Heart sounds: Normal heart sounds. No murmur heard.  Pulmonary:      Effort: Pulmonary effort is normal. No respiratory distress.      Breath sounds: Normal breath sounds.   Abdominal:      General: Abdomen is flat. Bowel sounds  are normal. There is no distension.      Palpations: Abdomen is soft.      Tenderness: There is abdominal tenderness (Diffuse tenderness with focal tenderness in the left lower quadrant).   Musculoskeletal:      Cervical back: Neck supple. No tenderness.      Right lower leg: No edema.      Left lower leg: No edema.      Comments: Subcutaneous tissue and muscle wasting   Skin:     Coloration: Skin is pale.   Neurological:      Mental Status: She is alert and oriented to person, place, and time.      Motor: No weakness.   Psychiatric:         Thought Content: Thought content normal.         Judgment: Judgment normal.         Fluids    Intake/Output Summary (Last 24 hours) at 8/15/2024 1532  Last data filed at 8/15/2024 1300  Gross per 24 hour   Intake 2263.01 ml   Output --   Net 2263.01 ml       Laboratory  Recent Labs     08/14/24  1631 08/15/24  0104 08/15/24  1147   WBC 9.1 7.5  --    RBC 3.61* 3.15*  --    HEMOGLOBIN 8.3* 7.2* 7.1*   HEMATOCRIT 28.5* 25.1* 24.9*   MCV 78.9* 79.7*  --    MCH 23.0* 22.9*  --    MCHC 29.1* 28.7*  --    RDW 46.0 46.1  --    PLATELETCT 393 347  --    MPV 10.1 10.6  --      Recent Labs     08/14/24  1631 08/15/24  0104   SODIUM 140 134*   POTASSIUM 3.1* 3.7   CHLORIDE 101 104   CO2 27 24   GLUCOSE 115* 121*   BUN 9 6*   CREATININE 0.41* 0.38*   CALCIUM 8.9 7.7*     Recent Labs     08/14/24  1631   INR 1.10               Imaging  CT-ABDOMEN-PELVIS WITH   Final Result      Abnormal mucosal enhancement and probable wall thickening of the gastric antrum and proximal duodenum with possible outpouching/ulcers suggesting gastroduodenitis/peptic ulcer disease. Correlate with endoscopy.      No definite free air      Colonic diverticulosis.      DX-CHEST-PORTABLE (1 VIEW)   Final Result      No acute cardiac or pulmonary abnormalities are identified.           Assessment/Plan  * Acute GI bleeding- (present on admission)  Assessment & Plan  Suspected  Recent EGD 8/7, unknown details    CTAP:  Gastroduodenitis/peptic ulcer disease  Avoid NSAID product  Trend H&H, transfuse as needed  IV Protonix  Carafate  Consult GI in a.m.    8/15/2024  Continue pantoprazole 40 mg IV twice daily  Continue sucralfate.  I discussed the case with AUREA Xiao of gastroenterology.  Planning for repeat endoscopy studies.  Continuing clear liquid diet for now.      Acute blood loss anemia- (present on admission)  Assessment & Plan  Likely due to above  Anemia workup-replace iron/B12/folate as needed  Trend H&H  Transfuse for hemoglobin less than 7 or hemodynamic instability    8/15/2024  Hemoglobin dropping to 7.1 with ongoing melena  Continue to monitor hemoglobin every 6 hours and transfuse for hemoglobin 7 or hemodynamic instability    Iron deficiency anemia due to chronic blood loss  Assessment & Plan  Profound  IV iron infusion    8/15/2024  Ferritin of 18.1.  Discussed with clinical pharmacist, planning for IV iron infusion following stabilization of GI bleed.    Hypokalemia  Assessment & Plan  Replace     8/15/2024  With similar improving to 3.7.  Have ordered additional 40 mg of KCl.  Continuous cardiac monitoring to monitor for arrhythmias.    Dehydration  Assessment & Plan  IVF    Severe protein-calorie malnutrition (HCC)  Assessment & Plan  Ensure  Dietary intake as tolerated  Nutrition eval  Body mass index is 16.36 kg/m².    8/15/2024  Evidenced by subcutaneous tissue and muscle wasting.  Bitemporal wasting.      GERD (gastroesophageal reflux disease)- (present on admission)  Assessment & Plan  PPI    ACP (advance care planning)- (present on admission)  Assessment & Plan  Goal of care discussed with patient in ER.  She stated she is agreeable for noninvasive, as well as invasive/heroic life-sustaining measures-including CPR/defibrillation/intubation or mechanical ventilation if needed.  She is also agreeable for further diagnostic workup and treatment of suspected GI bleed, possible blood transfusion,  possible endoscopic GI evaluation as warranted.  Diagnosis, prognosis, question and concern addressed.  Full CODE STATUS confirmed.  ACP: 16 minutes         VTE prophylaxis:   SCDs/TEDs   pharmacologic prophylaxis contraindicated due to Melena with concern for acute loss anemia      I have performed a physical exam and reviewed and updated ROS and Plan today (8/15/2024). In review of yesterday's note (8/14/2024), there are no changes except as documented above.      Greater than 53 minutes spent prepping to see patient (e.g. review of tests) obtaining and/or reviewing separately obtained history. Performing a medically appropriate examination and evaluation.  Counseling and educating the patient/family/caregiver.  Ordering medications, tests, or procedures.  Referring and communicating with other health care professionals.  Documenting clinical information in EPIC.  Independently interpreting results and communicating results to patient/family/caregiver.  Care coordination.

## 2024-08-15 NOTE — CONSULTS
Gastroenterology Initial Consult Note               Author:  Jessica Contreras M.D. Date & Time Created: 8/15/2024 3:41 PM       Patient ID:  Name:             Penelope Prince  YOB: 1950  Age:                 73 y.o.  female  MRN:               [id number]      Referring Provider:  Alo Jeffries MD        Presenting Chief Complaint:  Left sided abd pain      History of Present Illness:    This is a very pleasant 73 y.o. female presented yesterday to the ER with complaint of left sided abdominal pain due to gas and vomiting.  She had an EGD 1 week ago by GI consultants which showed a healing ulcer in her stomach.  The pain has persisted but she describes it as gas.  She has lost almost 40 pounds in the last 4 months.  She has had a change in her bowel habits.  Last colonoscopy was 5+ years ago.    Hemoglobin 8.3, MCV 78.9.  She underwent CT abdomen pelvis with contrast showing abnormal mucosal enhancement probable wall thickening of the gastric antrum and proximal duodenum with possible outpouching/ulcers. 1.4 cm indeterminate hypodensity along the falciform ligament.  No distended bowel, no walled off perforation.      Review of Systems:  Review of Systems   Constitutional:  Positive for weight loss.   HENT: Negative.     Eyes: Negative.    Respiratory: Negative.     Cardiovascular: Negative.    Gastrointestinal:  Positive for abdominal pain, diarrhea and vomiting.   Genitourinary: Negative.    Musculoskeletal: Negative.    Skin: Negative.    Neurological: Negative.    Endo/Heme/Allergies: Negative.              Past Medical History:  Past Medical History:   Diagnosis Date    Allergy     Arthritis     left shoulder    GERD (gastroesophageal reflux disease)     Hematuria      Active Hospital Problems    Diagnosis     Acute GI bleeding [K92.2]     Severe protein-calorie malnutrition (HCC) [E43]     Dehydration [E86.0]     Acute blood loss anemia [D62]     Iron deficiency anemia due to chronic blood  loss [D50.0]     Hypokalemia [E87.6]     GERD (gastroesophageal reflux disease) [K21.9]     ACP (advance care planning) [Z71.89]          Past Surgical History:  Past Surgical History:   Procedure Laterality Date    GANGLION EXCISION  10/19/2009    Performed by BETTY WADDELL at SURGERY [facility]    CATARACT EXTRACTION WITH IOL      joelle eyes           [facility] Medications:  Current Facility-Administered Medications   Medication Dose Frequency Provider Last Rate Last Admin    pantoprazole (Protonix) injection 40 mg  40 mg BID Alo Jeffries M.D.   40 mg at 08/15/24 0839    loperamide (Imodium) capsule 2 mg  2 mg 4X/DAY PRN Alo Jeffries M.D.   2 mg at 08/15/24 1346    Pharmacy Consult Request ...Pain Management Review 1 Each  1 Each PHARMACY TO DOSE Alo Jeffries M.D.        acetaminophen (Tylenol) tablet 1,000 mg  1,000 mg Q6HRS Alo Jeffries M.D.   1,000 mg at 08/15/24 1510    Followed by    [START ON 8/20/2024] acetaminophen (Tylenol) tablet 1,000 mg  1,000 mg Q6HRS PRN Alo Jeffries M.D.        oxyCODONE immediate-release (Roxicodone) tablet 2.5 mg  2.5 mg Q3HRS PRN Alo Jeffries M.D.        Or    oxyCODONE immediate-release (Roxicodone) tablet 5 mg  5 mg Q3HRS PRN Alo Jeffries M.D.        Or    morphine 4 MG/ML injection 2 mg  2 mg Q3HRS PRN Alo Jeffries M.D.        acetaminophen (Tylenol) tablet 650 mg  650 mg Q6HRS PRN Alo Jeffries M.D.        LR (Bolus) infusion 500 mL  500 mL Once PRN Pranav Giang M.D.        lactated ringers infusion   Continuous Pranav Giagn M.D. 83 mL/hr at 08/15/24 0549 New Bag at 08/15/24 0549    labetalol (Normodyne/Trandate) injection 10 mg  10 mg Q4HRS PRN Pranav Giang M.D.        ondansetron (Zofran) syringe/vial injection 4 mg  4 mg Q4HRS PRN Pranav Chaung, M.D.        ondansetron (Zofran ODT) dispertab 4 mg  4 mg Q4HRS PRN Pranav Giang M.D.        sucralfate (Carafate) 1 GM/10ML suspension 1 g  1 g Q6HRS Pranav Giang M.D.   1 g at 08/15/24 1200    MD Alert...Total Body  Iron Replacement per Pharmacy   PHARMACY TO DOSE Pranav Giang M.D.        cyanocobalamin (Vitamin B-12) tablet 1,000 mcg  1,000 mcg DAILY Pranav Giang M.D.   1,000 mcg at 08/15/24 0505    folic acid (Folvite) tablet 1 mg  1 mg BID Pranav Giang M.D.   1 mg at 08/15/24 0505   Last reviewed on 8/14/2024  7:54 PM by Ronaldo Merida       Current Outpatient Medications:  Medications Prior to Admission   Medication Sig Dispense Refill Last Dose    famotidine (PEPCID) 20 MG Tab Take 20 mg by mouth every day.   unk at unk    pantoprazole (PROTONIX) 40 MG Tablet Delayed Response Take 40 mg by mouth 2 times a day.   unk at unk    Polyethylene Glycol 3350 (PEG 3350 PO) Take 17 g by mouth every day.   unk at unk    ondansetron (ZOFRAN ODT) 4 MG TABLET DISPERSIBLE Take 4 mg by mouth every 8 hours as needed for Nausea/Vomiting.   unk at unk    sucralfate (CARAFATE) 1 GM/10ML Suspension Take 10 mL by mouth 4 times a day.   unk at unk    fluticasone (FLONASE) 50 MCG/ACT nasal spray Administer 1 Spray into affected nostril(S) every day.   unk at unk         Medication Allergies:  Allergies   Allergen Reactions    Banana Unspecified          Onion Unspecified          Tomato Unspecified          Aleve [Prenat W-O [id number]]      drowsiness         Family Medical History:  Family History   Problem Relation Age of Onset    Heart Disease Father     Diabetes Other     Cancer Maternal Grandmother     Cancer Maternal Grandfather     Heart Disease Paternal Grandmother     Heart Disease Paternal Grandfather     Lung Disease Sister          Social History:  Social History     Socioeconomic History    Marital status:      Spouse name: Not on file    Number of children: Not on file    Years of education: Not on file    Highest education level: Not on file   Occupational History    Not on file   Tobacco Use    Smoking status: Never    Smokeless tobacco: Never   Substance and Sexual Activity    Alcohol use: No     Comment:  rare    Drug use: No    Sexual activity: Not on file   Other Topics Concern    Not on file   Social History Narrative    Not on file     Social Determinants of Health     Financial Resource Strain: Not on file   Food Insecurity: No Food Insecurity (8/14/2024)    Hunger Vital Sign     Worried About Running Out of Food in the Last Year: Never true     Ran Out of Food in the Last Year: Never true   Transportation Needs: No Transportation Needs (8/14/2024)    PRAPARE - Transportation     Lack of Transportation (Medical): No     Lack of Transportation (Non-Medical): No   Physical Activity: Not on file   Stress: Not on file   Social Connections: Not on file   Intimate Partner Violence: Not At Risk (8/14/2024)    Humiliation, Afraid, Rape, and Kick questionnaire     Fear of Current or Ex-Partner: No     Emotionally Abused: No     Physically Abused: No     Sexually Abused: No   Housing Stability: Low Risk  (8/14/2024)    Housing Stability Vital Sign     Unable to Pay for Housing in the Last Year: No     Number of Times Moved in the Last Year: 1     Homeless in the Last Year: No         Vital signs:  Weight/BMI: Body mass index is 16.36 kg/m².  /56   Pulse (!) 58   Temp 36.5 °C (97.7 °F) (Temporal)   Resp 18   Ht 1.524 m (5')   Wt 38 kg (83 lb 12.4 oz)   SpO2 100%   Vitals:    08/15/24 0317 08/15/24 0733 08/15/24 1111 08/15/24 1528   BP: 124/65 122/64 106/81 105/56   Pulse: 68 62 66 (!) 58   Resp: 16 16 18 18   Temp:  36.9 °C (98.4 °F) 36.6 °C (97.9 °F) 36.5 °C (97.7 °F)   TempSrc: Temporal Temporal Temporal Temporal   SpO2: 95% 96% 98% 100%   Weight: 38 kg (83 lb 12.4 oz)      Height:         Oxygen Therapy:  Pulse Oximetry: 100 %, O2 (LPM): 0, O2 Delivery Device: None - Room Air    Intake/Output Summary (Last 24 hours) at 8/15/2024 1541  Last data filed at 8/15/2024 1300  Gross per 24 hour   Intake 2263.01 ml   Output --   Net 2263.01 ml         Physical Exam:  Physical Exam  Constitutional:       General: She  is not in acute distress.     Appearance: She is not toxic-appearing or diaphoretic.      Comments: 72yo female appears older than stated age, thin body habitus   HENT:      Head: Normocephalic and atraumatic.      Nose: Nose normal.      Mouth/Throat:      Mouth: Mucous membranes are moist.   Eyes:      General: No scleral icterus.  Cardiovascular:      Rate and Rhythm: Normal rate and regular rhythm.      Heart sounds: Normal heart sounds.   Pulmonary:      Effort: Pulmonary effort is normal.      Breath sounds: Normal breath sounds.   Abdominal:      General: Bowel sounds are normal.      Palpations: Abdomen is soft.      Tenderness: There is abdominal tenderness. There is guarding.   Musculoskeletal:         General: Normal range of motion.      Cervical back: Neck supple.   Skin:     General: Skin is warm and dry.   Neurological:      Mental Status: She is oriented to person, place, and time.                 Labs:  Recent Labs     08/14/24  1631 08/15/24  0104   SODIUM 140 134*   POTASSIUM 3.1* 3.7   CHLORIDE 101 104   CO2 27 24   BUN 9 6*   CREATININE 0.41* 0.38*   MAGNESIUM 2.1 2.4   PHOSPHORUS  --  3.0   CALCIUM 8.9 7.7*     Recent Labs     08/14/24  1631 08/15/24  0104   ALTSGPT 16 12   ASTSGOT 18 13   ALKPHOSPHAT 69 63   TBILIRUBIN 0.4 0.3   LIPASE 16  --    GLUCOSE 115* 121*     Recent Labs     08/14/24  1631 08/15/24  0104   WBC 9.1 7.5   NEUTSPOLYS 85.90* 81.00*   LYMPHOCYTES 5.30* 10.10*   MONOCYTES 8.10 7.60   EOSINOPHILS 0.00 0.40   BASOPHILS 0.40 0.50   ASTSGOT 18 13   ALTSGPT 16 12   ALKPHOSPHAT 69 63   TBILIRUBIN 0.4 0.3     Recent Labs     08/14/24  1631 08/15/24  0104 08/15/24  1147   RBC 3.61* 3.15*  --    HEMOGLOBIN 8.3* 7.2* 7.1*   HEMATOCRIT 28.5* 25.1* 24.9*   PLATELETCT 393 347  --    PROTHROMBTM 14.3  --   --    INR 1.10  --   --    IRON 17* 13*  --    FERRITIN 18.1  --   --    TOTIRONBC 288 224*  --      Recent Results (from the past 24 hour(s))   Urinalysis    Collection Time: 08/14/24   3:55 PM    Specimen: Urine   Result Value Ref Range    Color Yellow     Character Turbid (A)     Specific Gravity 1.014 <1.035    Ph 8.5 (A) 5.0 - 8.0    Glucose Negative Negative mg/dL    Ketones 15 (A) Negative mg/dL    Protein Negative Negative mg/dL    Bilirubin Negative Negative    Urobilinogen, Urine 0.2 Negative    Nitrite Negative Negative    Leukocyte Esterase Negative Negative    Occult Blood Negative Negative    Micro Urine Req Microscopic    URINE MICROSCOPIC (W/UA)    Collection Time: 08/14/24  3:55 PM   Result Value Ref Range    WBC 0-2 /hpf    RBC 0-2 /hpf    Bacteria Negative None /hpf    Epithelial Cells Few /hpf    Mucous Threads Few /hpf    Amorphous Crystal Present /hpf    Hyaline Cast 0-2 /lpf   Lipase    Collection Time: 08/14/24  4:31 PM   Result Value Ref Range    Lipase 16 11 - 82 U/L   CBC with Differential    Collection Time: 08/14/24  4:31 PM   Result Value Ref Range    WBC 9.1 4.8 - 10.8 K/uL    RBC 3.61 (L) 4.20 - 5.40 M/uL    Hemoglobin 8.3 (L) 12.0 - 16.0 g/dL    Hematocrit 28.5 (L) 37.0 - 47.0 %    MCV 78.9 (L) 81.4 - 97.8 fL    MCH 23.0 (L) 27.0 - 33.0 pg    MCHC 29.1 (L) 32.2 - 35.5 g/dL    RDW 46.0 35.9 - 50.0 fL    Platelet Count 393 164 - 446 K/uL    MPV 10.1 9.0 - 12.9 fL    Neutrophils-Polys 85.90 (H) 44.00 - 72.00 %    Lymphocytes 5.30 (L) 22.00 - 41.00 %    Monocytes 8.10 0.00 - 13.40 %    Eosinophils 0.00 0.00 - 6.90 %    Basophils 0.40 0.00 - 1.80 %    Immature Granulocytes 0.30 0.00 - 0.90 %    Nucleated RBC 0.00 0.00 - 0.20 /100 WBC    Neutrophils (Absolute) 7.79 (H) 1.82 - 7.42 K/uL    Lymphs (Absolute) 0.48 (L) 1.00 - 4.80 K/uL    Monos (Absolute) 0.74 0.00 - 0.85 K/uL    Eos (Absolute) 0.00 0.00 - 0.51 K/uL    Baso (Absolute) 0.04 0.00 - 0.12 K/uL    Immature Granulocytes (abs) 0.03 0.00 - 0.11 K/uL    NRBC (Absolute) 0.00 K/uL    Hypochromia 1+     Anisocytosis 1+     Microcytosis 2+ (A)    Complete Metabolic Panel (CMP)    Collection Time: 08/14/24  4:31 PM   Result  Value Ref Range    Sodium 140 135 - 145 mmol/L    Potassium 3.1 (L) 3.6 - 5.5 mmol/L    Chloride 101 96 - 112 mmol/L    Co2 27 20 - 33 mmol/L    Anion Gap 12.0 7.0 - 16.0    Glucose 115 (H) 65 - 99 mg/dL    Bun 9 8 - 22 mg/dL    Creatinine 0.41 (L) 0.50 - 1.40 mg/dL    Calcium 8.9 8.5 - 10.5 mg/dL    Correct Calcium 9.4 8.5 - 10.5 mg/dL    AST(SGOT) 18 12 - 45 U/L    ALT(SGPT) 16 2 - 50 U/L    Alkaline Phosphatase 69 30 - 99 U/L    Total Bilirubin 0.4 0.1 - 1.5 mg/dL    Albumin 3.4 3.2 - 4.9 g/dL    Total Protein 5.2 (L) 6.0 - 8.2 g/dL    Globulin 1.8 (L) 1.9 - 3.5 g/dL    A-G Ratio 1.9 g/dL   Troponins NOW    Collection Time: 08/14/24  4:31 PM   Result Value Ref Range    Troponin T 48 (H) 6 - 19 ng/L   ESTIMATED GFR    Collection Time: 08/14/24  4:31 PM   Result Value Ref Range    GFR (CKD-EPI) 103 >60 mL/min/1.73 m 2   PLATELET ESTIMATE    Collection Time: 08/14/24  4:31 PM   Result Value Ref Range    Plt Estimation Normal    MORPHOLOGY    Collection Time: 08/14/24  4:31 PM   Result Value Ref Range    RBC Morphology Present     Poikilocytosis 1+     Ovalocytes 1+     Tear Drop Cells 1+     Stomatocytes 1+     Toxic Vacuoles Few    PERIPHERAL SMEAR REVIEW    Collection Time: 08/14/24  4:31 PM   Result Value Ref Range    Peripheral Smear Review see below    DIFFERENTIAL COMMENT    Collection Time: 08/14/24  4:31 PM   Result Value Ref Range    Comments-Diff see below    Prothrombin time (INR)    Collection Time: 08/14/24  4:31 PM   Result Value Ref Range    PT 14.3 12.0 - 14.6 sec    INR 1.10 0.87 - 1.13   CREATINE KINASE    Collection Time: 08/14/24  4:31 PM   Result Value Ref Range    CPK Total 128 0 - 154 U/L   IRON/TOTAL IRON BIND    Collection Time: 08/14/24  4:31 PM   Result Value Ref Range    Iron 17 (L) 40 - 170 ug/dL    Total Iron Binding 288 250 - 450 ug/dL    Unsat Iron Binding 271 110 - 370 ug/dL    % Saturation 6 (L) 15 - 55 %   LDH    Collection Time: 08/14/24  4:31 PM   Result Value Ref Range    LDH  Total 332 (H) 107 - 266 U/L   TRANSFERRIN    Collection Time: 24  4:31 PM   Result Value Ref Range    Transferrin 229 200 - 370 mg/dL   RETICULOCYTES COUNT    Collection Time: 24  4:31 PM   Result Value Ref Range    Reticulocyte Count 1.8 0.8 - 2.6 %    Retic, Absolute 0.06 0.04 - 0.12 M/uL    Imm. Reticulocyte Fraction 14.9 2.6 - 16.1 %    Retic Hgb Equivalent 18.3 (L) 29.0 - 35.0 pg/cell   MAGNESIUM    Collection Time: 24  4:31 PM   Result Value Ref Range    Magnesium 2.1 1.5 - 2.5 mg/dL   FOLATE SERUM/PLASMA    Collection Time: 24  4:31 PM   Result Value Ref Range    Folate -Folic Acid 10.3 >4.0 ng/mL   FERRITIN    Collection Time: 24  4:31 PM   Result Value Ref Range    Ferritin 18.1 10.0 - 291.0 ng/mL   EKG (Now)    Collection Time: 24  6:01 PM   Result Value Ref Range    Report       [facility] Emergency Dept.    Test Date:  2024  Pt Name:    HORACE MERCADO                 Department: ER  MRN:        [id number]                      Room:        21 H  Gender:     Female                       Technician: [id number]  :        1950                   Requested By:ALEC HACKETT  Order #:    [id number]                    Reading MD:    Measurements  Intervals                                Axis  Rate:       74                           P:          64  MN:         158                          QRS:        16  QRSD:       82                           T:          53  QT:         404  QTc:        449    Interpretive Statements  Sinus rhythm  Borderline low voltage, extremity leads  Compared to ECG 2024 15:36:18  Short MN interval no longer present     Troponins in two (2) hours    Collection Time: 24  6:56 PM   Result Value Ref Range    Troponin T 45 (H) 6 - 19 ng/L   CBC WITH DIFFERENTIAL    Collection Time: 08/15/24  1:04 AM   Result Value Ref Range    WBC 7.5 4.8 - 10.8 K/uL    RBC 3.15 (L) 4.20 - 5.40 M/uL    Hemoglobin 7.2 (L) 12.0 - 16.0  g/dL    Hematocrit 25.1 (L) 37.0 - 47.0 %    MCV 79.7 (L) 81.4 - 97.8 fL    MCH 22.9 (L) 27.0 - 33.0 pg    MCHC 28.7 (L) 32.2 - 35.5 g/dL    RDW 46.1 35.9 - 50.0 fL    Platelet Count 347 164 - 446 K/uL    MPV 10.6 9.0 - 12.9 fL    Neutrophils-Polys 81.00 (H) 44.00 - 72.00 %    Lymphocytes 10.10 (L) 22.00 - 41.00 %    Monocytes 7.60 0.00 - 13.40 %    Eosinophils 0.40 0.00 - 6.90 %    Basophils 0.50 0.00 - 1.80 %    Immature Granulocytes 0.40 0.00 - 0.90 %    Nucleated RBC 0.00 0.00 - 0.20 /100 WBC    Neutrophils (Absolute) 6.07 1.82 - 7.42 K/uL    Lymphs (Absolute) 0.76 (L) 1.00 - 4.80 K/uL    Monos (Absolute) 0.57 0.00 - 0.85 K/uL    Eos (Absolute) 0.03 0.00 - 0.51 K/uL    Baso (Absolute) 0.04 0.00 - 0.12 K/uL    Immature Granulocytes (abs) 0.03 0.00 - 0.11 K/uL    NRBC (Absolute) 0.00 K/uL   Comp Metabolic Panel    Collection Time: 08/15/24  1:04 AM   Result Value Ref Range    Sodium 134 (L) 135 - 145 mmol/L    Potassium 3.7 3.6 - 5.5 mmol/L    Chloride 104 96 - 112 mmol/L    Co2 24 20 - 33 mmol/L    Anion Gap 6.0 (L) 7.0 - 16.0    Glucose 121 (H) 65 - 99 mg/dL    Bun 6 (L) 8 - 22 mg/dL    Creatinine 0.38 (L) 0.50 - 1.40 mg/dL    Calcium 7.7 (L) 8.5 - 10.5 mg/dL    Correct Calcium 8.5 8.5 - 10.5 mg/dL    AST(SGOT) 13 12 - 45 U/L    ALT(SGPT) 12 2 - 50 U/L    Alkaline Phosphatase 63 30 - 99 U/L    Total Bilirubin 0.3 0.1 - 1.5 mg/dL    Albumin 3.0 (L) 3.2 - 4.9 g/dL    Total Protein 4.5 (L) 6.0 - 8.2 g/dL    Globulin 1.5 (L) 1.9 - 3.5 g/dL    A-G Ratio 2.0 g/dL   MAGNESIUM    Collection Time: 08/15/24  1:04 AM   Result Value Ref Range    Magnesium 2.4 1.5 - 2.5 mg/dL   PHOSPHORUS    Collection Time: 08/15/24  1:04 AM   Result Value Ref Range    Phosphorus 3.0 2.5 - 4.5 mg/dL   RETICULOCYTES COUNT    Collection Time: 08/15/24  1:04 AM   Result Value Ref Range    Reticulocyte Count 1.8 0.8 - 2.6 %    Retic, Absolute 0.06 0.04 - 0.12 M/uL    Imm. Reticulocyte Fraction 13.8 2.6 - 16.1 %    Retic Hgb Equivalent 17.3 (L)  29.0 - 35.0 pg/cell   IRON/TOTAL IRON BIND    Collection Time: 08/15/24  1:04 AM   Result Value Ref Range    Iron 13 (L) 40 - 170 ug/dL    Total Iron Binding 224 (L) 250 - 450 ug/dL    Unsat Iron Binding 211 110 - 370 ug/dL    % Saturation 6 (L) 15 - 55 %   ESTIMATED GFR    Collection Time: 08/15/24  1:04 AM   Result Value Ref Range    GFR (CKD-EPI) 105 >60 mL/min/1.73 m 2   VITAMIN B12    Collection Time: 08/15/24  3:32 AM   Result Value Ref Range    Vitamin B12 -True Cobalamin 368 211 - 911 pg/mL   OCCULT BLOOD STOOL    Collection Time: 08/15/24  8:40 AM   Result Value Ref Range    Occult Blood Feces Positive (A) Negative   C Diff by PCR rflx Toxin    Collection Time: 08/15/24  9:46 AM    Specimen: Stool   Result Value Ref Range    C Diff by PCR Negative Negative    [id number] Presumptive Negative Negative   VITAMIN B12    Collection Time: 08/15/24 11:47 AM   Result Value Ref Range    Vitamin B12 -True Cobalamin 727 211 - 911 pg/mL   HEMOGLOBIN AND HEMATOCRIT    Collection Time: 08/15/24 11:47 AM   Result Value Ref Range    Hemoglobin 7.1 (L) 12.0 - 16.0 g/dL    Hematocrit 24.9 (L) 37.0 - 47.0 %         Radiology Review:  CT-ABDOMEN-PELVIS WITH   Final Result      Abnormal mucosal enhancement and probable wall thickening of the gastric antrum and proximal duodenum with possible outpouching/ulcers suggesting gastroduodenitis/peptic ulcer disease. Correlate with endoscopy.      No definite free air      Colonic diverticulosis.      DX-CHEST-PORTABLE (1 VIEW)   Final Result      No acute cardiac or pulmonary abnormalities are identified.            MDM (Data Review):   -Records reviewed and summarized in current documentation  -I personally reviewed and interpreted the laboratory results  -I personally reviewed the radiology images    Assessment/Recommendations:    Impression:   Left sided abdominal pain, predominantly LLQ, more than right sided   Unintentional weight loss   Microcytic anemia/iron deficiency   Heme  positive stool   Protein calorie malnutrition   History of gastric and duodenal ulcer.  Need endoscopy report   History of pyloric stenosis, s/p dlation    Recs:  Have reached out to her GI physician about recent EGD report  No worrisome findings on CT  ??colon neoplasm with c/o bloating/gas, weight loss, change in bowel habits, microcytic anemia  Will keep her on cl liq until more information gathered      Addendum:  records received from [facility]    8/7/24 EGD:    Esophagus: normal  Stomach:  retained food; clean based, cratered non bleeding 20mm ulcer in pylorus, biopsied  Duodenum: benign intrinsic stricture distal bulb, could not traverse with endoscope    7/2024 GI Consult at hospital.  Pt c/o nausea, dyspepsia and gas    5/2024:  evaluated for dyspepsia, dysphagia, weight loss, anemia.    EGD: Empiric 54 Italian esophageal dilation 3 cm pyloric channel ulcer, duodenus 2nd-3rd portion distorted due to pylori channel ulcer  Colonoscopy to the terminal ileum, sigmoid diverticulosis, dark material suggestive of melena    With partial GOO, needs low fiber diet and small more frequent meals with liquids being better  No need for colonoscopy at this point  ??still taking NSAIDs.  Need to follow up on path from GI.  If not H pylori, suspicious of NSAIDs athough she denies      Jessica Contreras M.D.          Core Quality Measures   Reviewed items:  Labs, Medications and Radiology reports reviewed

## 2024-08-15 NOTE — ASSESSMENT & PLAN NOTE
Ensure  Dietary intake as tolerated  Nutrition eval  Body mass index is 16.36 kg/m².    8/15/2024  Evidenced by subcutaneous tissue and muscle wasting.  Bitemporal wasting.

## 2024-08-15 NOTE — CARE PLAN
The patient is Stable - Low risk of patient condition declining or worsening    Shift Goals  Clinical Goals: monitor s/s of bleeding, VSS, monitor labs  Patient Goals: rest, feel better  Family Goals: updates    Progress made toward(s) clinical / shift goals:    Problem: Pain - Standard  Goal: Alleviation of pain or a reduction in pain to the patient’s comfort goal  Outcome: Progressing  Pt no longer complaining of abd pain.     Problem: Knowledge Deficit - Standard  Goal: Patient and family/care givers will demonstrate understanding of plan of care, disease process/condition, diagnostic tests and medications  Outcome: Progressing  Pt aware of POC and when to call for assistance.      Problem: Provide Safe Environment  Goal: Suicide environmental safety, protocols, policies, and practices will be implemented  Outcome: Progressing  Fall precautions in place, pt calls appropriately.      Problem: Fall Risk  Goal: Patient will remain free from falls  Outcome: Progressing  Fall precautions in place, pt calls appropriately.

## 2024-08-15 NOTE — ED NOTES
Medication history reviewed with patient at bedside and patients home pharmacy ([facility] [phone]).     Med rec is complete.  Allergies reviewed.     Patient has not had any outpatient antibiotics in the last 30 days.   Anticoagulants: No    Patient was confused on names/ strengths of medications. Med list obtained from [facility].    Satish Gilbert PhT

## 2024-08-15 NOTE — ASSESSMENT & PLAN NOTE
Goal of care discussed with patient in ER.  She stated she is agreeable for noninvasive, as well as invasive/heroic life-sustaining measures-including CPR/defibrillation/intubation or mechanical ventilation if needed.  She is also agreeable for further diagnostic workup and treatment of suspected GI bleed, possible blood transfusion, possible endoscopic GI evaluation as warranted.  Diagnosis, prognosis, question and concern addressed.  Full CODE STATUS confirmed.  ACP: 16 minutes

## 2024-08-15 NOTE — ASSESSMENT & PLAN NOTE
Replace     8/15/2024  With similar improving to 3.7.  Have ordered additional 40 mg of KCl.  Continuous cardiac monitoring to monitor for arrhythmias.

## 2024-08-15 NOTE — ASSESSMENT & PLAN NOTE
Suspected  Recent EGD 8/7, unknown details    CTAP: Gastroduodenitis/peptic ulcer disease  Avoid NSAID product  Trend H&H, transfuse as needed  IV Protonix  Carafate  Consult GI in a.m.    8/15/2024  Continue pantoprazole 40 mg IV twice daily  Continue sucralfate.  I discussed the case with AUREA Xiao of gastroenterology.  Planning for repeat endoscopy studies.  Continuing clear liquid diet for now.

## 2024-08-15 NOTE — ED NOTES
Medicated per MAR. Patient transported to T7 by [address]. Attached full vitals monitoring. Aox4, on room air.

## 2024-08-15 NOTE — HOSPITAL COURSE
Penelope Prince is a 73 y.o. female who presented on 8/14/2024 with abdominal pain and hematemesis.  This is a pleasant woman with a history of peptic ulcer disease, GERD, and arthritis, who recently underwent endoscopy by [facility] on 8/7/2024, after which the patient bloating.  She presented to [facility] yesterday and was noted to have a hemoglobin of 9 and was subsequently discharged to home.  She returned to [facility] emergency room due to ongoing abdominal discomfort and bloating.  Patient reported taking her pantoprazole following her last endoscopy studies with ongoing bloating symptoms and stomach discomfort.    In the emergency room, repeat hemoglobin was 8.3.  CT scan of the abdomen and pelvis noted abnormal mucosal enhancement with probable wall thickening of gastric antral and proximal duodenum with possible outpouching/ulcer suggestive of gastroduodenitis/peptic ulcer disease.    Patient was started on intravenous pantoprazole and admitted to the telemetry floor for further care evaluation.

## 2024-08-15 NOTE — H&P
Hospital Medicine History & Physical Note    Date of Service  8/14/2024    Primary Care Physician  Pcp Pt States None    Consultants  None    Code Status  Full Code    Chief Complaint  Chief Complaint   Patient presents with    Abdominal Pain    Vomiting       History of Presenting Illness  Penelope Prince is a 73 y.o. female with history of PUD, recent endoscopy by [facility] on 8/7 who presented 8/14/2024 with evaluation for abdominal pain, bloody vomitus.  Patient reported visiting [facility] yesterday, hemoglobin 9, discharged.  She returned to [facility] ER today mainly due to abdominal discomfort.  Hemoglobin today is 8.3.  CTAP noted abnormal mucosal enhancement and probable wall thickening of gastric antral and proximal duodenum with possible outpouching/ulcer suggesting gastroduodenitis/peptic ulcer disease.  Patient was given PPI.  Admission requested by ERP.  Admitted to medicine service.    I discussed the plan of care with patient and bedside RN.    Review of Systems  Review of Systems   Constitutional:  Positive for malaise/fatigue. Negative for chills and fever.   HENT:  Negative for hearing loss and tinnitus.    Eyes:  Negative for blurred vision and double vision.   Respiratory:  Negative for cough and shortness of breath.    Cardiovascular:  Negative for chest pain and palpitations.   Gastrointestinal:  Positive for abdominal pain, blood in stool, heartburn, nausea and vomiting.   Genitourinary:  Negative for dysuria and hematuria.   Musculoskeletal:  Negative for back pain, joint pain and myalgias.   Skin:  Negative for itching and rash.   Neurological:  Positive for focal weakness. Negative for dizziness and headaches.   Endo/Heme/Allergies:  Negative for environmental allergies. Bruises/bleeds easily.   Psychiatric/Behavioral:  Negative for depression and substance abuse.    All other systems reviewed and are negative.      Past Medical History   has a past medical  history of Allergy, Arthritis, GERD (gastroesophageal reflux disease), and Hematuria.    Surgical History   has a past surgical history that includes cataract extraction with iol and ganglion excision (10/19/2009).     Family History  family history includes Cancer in her maternal grandfather and maternal grandmother; Diabetes in an other family member; Heart Disease in her father, paternal grandfather, and paternal grandmother; Lung Disease in her sister.   Family history reviewed with patient. There is family history that is pertinent to the chief complaint.     Social History   reports that she has never smoked. She has never used smokeless tobacco. She reports that she does not drink alcohol and does not use drugs.    Allergies  Allergies   Allergen Reactions    Banana Unspecified          Onion Unspecified          Tomato Unspecified          Aleve [Prenat W-O A-Mh-Wbesbem-Fa-Dha]      drowsiness       Medications  Prior to Admission Medications   Prescriptions Last Dose Informant Patient Reported? Taking?   Polyethylene Glycol 3350 (PEG 3350 PO) unk at [facility] Patient's Home Pharmacy Yes Yes   Sig: Take 17 g by mouth every day.   famotidine (PEPCID) 20 MG Tab unk at [facility] Patient's Home Pharmacy Yes Yes   Sig: Take 20 mg by mouth every day.   fluticasone (FLONASE) 50 MCG/ACT nasal spray unk at [facility] Patient's Home Pharmacy Yes Yes   Sig: Administer 1 Spray into affected nostril(S) every day.   ondansetron (ZOFRAN ODT) 4 MG TABLET DISPERSIBLE unk at [facility] Patient's Home Pharmacy Yes Yes   Sig: Take 4 mg by mouth every 8 hours as needed for Nausea/Vomiting.   pantoprazole (PROTONIX) 40 MG Tablet Delayed Response unk at [facility] Patient's Home Pharmacy Yes Yes   Sig: Take 40 mg by mouth 2 times a day.   sucralfate (CARAFATE) 1 GM/10ML Suspension unk at [facility] Patient's Home Pharmacy Yes Yes   Sig: Take 10 mL by mouth 4 times a day.      Facility-Administered Medications: None       Physical Exam  Temp:  [36.8 °C (98.3 °F)-36.9 °C  (98.4 °F)] 36.9 °C (98.4 °F)  Pulse:  [66-80] 78  Resp:  [14-19] 16  BP: (109-144)/() 121/103  SpO2:  [85 %-100 %] 97 %  Blood Pressure : (!) 121/103   Temperature: 36.9 °C (98.4 °F)   Pulse: 78   Respiration: 16   Pulse Oximetry: 97 %       Physical Exam  Vitals and nursing note reviewed.   Constitutional:       General: She is not in acute distress.     Appearance: She is ill-appearing.      Comments: Frail, underweight   HENT:      Head: Normocephalic and atraumatic.      Nose: Nose normal.      Mouth/Throat:      Mouth: Mucous membranes are moist.      Pharynx: Oropharynx is clear.   Eyes:      General: No scleral icterus.     Extraocular Movements: Extraocular movements intact.   Cardiovascular:      Rate and Rhythm: Normal rate and regular rhythm.      Pulses: Normal pulses.      Heart sounds:      No friction rub.   Pulmonary:      Effort: No respiratory distress.      Breath sounds: No rales.   Chest:      Chest wall: No tenderness.   Abdominal:      General: There is no distension.      Tenderness: There is no abdominal tenderness. There is no guarding or rebound.   Musculoskeletal:         General: Normal range of motion.      Cervical back: Neck supple. No tenderness.      Right lower leg: No edema.      Left lower leg: No edema.   Skin:     General: Skin is warm and dry.      Capillary Refill: Capillary refill takes less than 2 seconds.   Neurological:      General: No focal deficit present.      Mental Status: She is alert and oriented to person, place, and time.   Psychiatric:         Mood and Affect: Mood normal.         Laboratory:  Recent Labs     08/14/24  1631   WBC 9.1   RBC 3.61*   HEMOGLOBIN 8.3*   HEMATOCRIT 28.5*   MCV 78.9*   MCH 23.0*   MCHC 29.1*   RDW 46.0   PLATELETCT 393   MPV 10.1     Recent Labs     08/14/24  1631   SODIUM 140   POTASSIUM 3.1*   CHLORIDE 101   CO2 27   GLUCOSE 115*   BUN 9   CREATININE 0.41*   CALCIUM 8.9     Recent Labs     08/14/24  1631   ALTSGPT 16   ASTSGOT  "18   ALKPHOSPHAT 69   TBILIRUBIN 0.4   LIPASE 16   GLUCOSE 115*     Recent Labs     08/14/24  1631   INR 1.10     No results for input(s): \"NTPROBNP\" in the last 72 hours.      Recent Labs     08/14/24  1631 08/14/24  1856   TROPONINT 48* 45*       Imaging:  CT-ABDOMEN-PELVIS WITH   Final Result      Abnormal mucosal enhancement and probable wall thickening of the gastric antrum and proximal duodenum with possible outpouching/ulcers suggesting gastroduodenitis/peptic ulcer disease. Correlate with endoscopy.      No definite free air      Colonic diverticulosis.      DX-CHEST-PORTABLE (1 VIEW)   Final Result      No acute cardiac or pulmonary abnormalities are identified.          X-Ray:  I have personally reviewed the images and compared with prior images.  EKG:  I have personally reviewed the images and compared with prior images.    Assessment/Plan:  Justification for Admission Status  I anticipate this patient will require at least 2 midnights hospitalization, therefore appropriate for inpatient status.        * Acute GI bleeding- (present on admission)  Assessment & Plan  Suspected  Recent EGD 8/7, unknown details    CTAP: Gastroduodenitis/peptic ulcer disease  Avoid NSAID product  Trend H&H, transfuse as needed  IV Protonix  Carafate  Consult GI in a.m.    Hypokalemia  Assessment & Plan  Replace     Iron deficiency anemia  Assessment & Plan  Profound  IV iron infusion    Acute blood loss anemia  Assessment & Plan  Likely due to above  Anemia workup-replace iron/B12/folate as needed  Trend H&H  Transfuse for hemoglobin less than 7 or hemodynamic instability    Dehydration  Assessment & Plan  IVF    Severe protein-calorie malnutrition (HCC)  Assessment & Plan  Ensure  Dietary intake as tolerated  Nutrition eval  Body mass index is 16.36 kg/m².      GERD (gastroesophageal reflux disease)- (present on admission)  Assessment & Plan  PPI    ACP (advance care planning)- (present on admission)  Assessment & Plan  Goal " of care discussed with patient in ER.  She stated she is agreeable for noninvasive, as well as invasive/heroic life-sustaining measures-including CPR/defibrillation/intubation or mechanical ventilation if needed.  She is also agreeable for further diagnostic workup and treatment of suspected GI bleed, possible blood transfusion, possible endoscopic GI evaluation as warranted.  Diagnosis, prognosis, question and concern addressed.  Full CODE STATUS confirmed.  ACP: 16 minutes        VTE prophylaxis: SCDs/TEDs

## 2024-08-15 NOTE — ASSESSMENT & PLAN NOTE
Likely due to above  Anemia workup-replace iron/B12/folate as needed  Trend H&H  Transfuse for hemoglobin less than 7 or hemodynamic instability    8/15/2024  Hemoglobin dropping to 7.1 with ongoing melena  Continue to monitor hemoglobin every 6 hours and transfuse for hemoglobin 7 or hemodynamic instability

## 2024-08-15 NOTE — DISCHARGE PLANNING
In the case of an emergency, pt's legal NOK is Theresa kenyon     RNCM met with pt at bedside and obtained the information used in this assessment. Pt verified accuracy of facesheet. Pt lives in a mobile home with daughter, son in law, and son.  Pt uses "[facility] pharmacy. Prior to current hospitalization, pt was completely independent in ADLS/IADLS.  Pt has a good support system. Pt denies any hx of substance use and denies any dx of mh.Owns no DME. Expect discharge to home when medically cleared.     Care Transition Team Assessment    Information Source:pt  Orientation Level: Oriented X4  Information Given By: Patient  Informant's Name: Penelope  Who is responsible for making decisions for patient? : Patient         Elopement Risk  Legal Hold: (P) No  Ambulatory or Self Mobile in Wheelchair: (P) Yes  Disoriented: (P) No  Psychiatric Symptoms: (P) None  History of Wandering: (P) No  Elopement this Admit: (P) No  Vocalizing Wanting to Leave: (P) No  Displays Behaviors, Body Language Wanting to Leave: (P) No-Not at Risk for Elopement  Elopement Risk: (P) Not at Risk for Elopement    Interdisciplinary Discharge Planning  Does Admitting Nurse Feel This Could be a Complex Discharge?: No  Primary Care Physician: Gualberto Kelly at [facility]  Lives with - Patient's Self Care Capacity: Adult Children  Patient or legal guardian wants to designate a caregiver: No  Support Systems: Family Member(s), Friends / Neighbors  Housing / Facility: Mobile Home  Do You Take your Prescribed Medications Regularly: Yes  Mobility Issues: No  Durable Medical Equipment: Not Applicable    Discharge Preparedness  What is your plan after discharge?: Home with help  What are your discharge supports?: Child  Prior Functional Level: Ambulatory, Independent with Activities of Daily Living, Independent with Medication Management  Difficulity with ADLs: None  Difficulity with IADLs: None    Functional Assesment  Prior Functional  Level: Ambulatory, Independent with Activities of Daily Living, Independent with Medication Management    Finances  Prescription Coverage: Yes    Vision / Hearing Impairment  Vision Impairment : Yes  Right Eye Vision: (P) Impaired, Wears Glasses  Left Eye Vision: (P) Impaired, Wears Glasses  Hearing Impairment : No              Domestic Abuse  Have you ever been the victim of abuse or violence?: No  Possible Abuse/Neglect Reported to:: Not Applicable    Psychological Assessment  History of Substance Abuse: None  History of Psychiatric Problems: No         Anticipated Discharge Information  Discharge Disposition: Discharged to home/self care (01)

## 2024-08-15 NOTE — ASSESSMENT & PLAN NOTE
Profound  IV iron infusion    8/15/2024  Ferritin of 18.1.  Discussed with clinical pharmacist, planning for IV iron infusion following stabilization of GI bleed.

## 2024-08-16 ENCOUNTER — PHARMACY VISIT (OUTPATIENT)
Dept: PHARMACY | Facility: MEDICAL CENTER | Age: 74
End: 2024-08-16
Payer: COMMERCIAL

## 2024-08-16 VITALS
WEIGHT: 84 LBS | BODY MASS INDEX: 16.49 KG/M2 | HEIGHT: 60 IN | RESPIRATION RATE: 16 BRPM | HEART RATE: 81 BPM | SYSTOLIC BLOOD PRESSURE: 90 MMHG | DIASTOLIC BLOOD PRESSURE: 56 MMHG | TEMPERATURE: 97.9 F | OXYGEN SATURATION: 93 %

## 2024-08-16 PROBLEM — K27.9 PUD (PEPTIC ULCER DISEASE): Status: ACTIVE | Noted: 2024-08-16

## 2024-08-16 PROBLEM — D62 ACUTE BLOOD LOSS ANEMIA: Status: RESOLVED | Noted: 2024-08-14 | Resolved: 2024-08-16

## 2024-08-16 PROBLEM — K92.2 ACUTE GI BLEEDING: Status: RESOLVED | Noted: 2024-08-14 | Resolved: 2024-08-16

## 2024-08-16 PROBLEM — R19.7 DIARRHEA: Status: ACTIVE | Noted: 2024-08-16

## 2024-08-16 PROBLEM — E86.0 DEHYDRATION: Status: RESOLVED | Noted: 2024-08-14 | Resolved: 2024-08-16

## 2024-08-16 LAB
HCT VFR BLD AUTO: 26.7 % (ref 37–47)
HCT VFR BLD AUTO: 32.3 % (ref 37–47)
HCT VFR BLD AUTO: 34.7 % (ref 37–47)
HGB BLD-MCNC: 10 G/DL (ref 12–16)
HGB BLD-MCNC: 7.8 G/DL (ref 12–16)
HGB BLD-MCNC: 9.4 G/DL (ref 12–16)

## 2024-08-16 PROCEDURE — 700102 HCHG RX REV CODE 250 W/ 637 OVERRIDE(OP): Performed by: STUDENT IN AN ORGANIZED HEALTH CARE EDUCATION/TRAINING PROGRAM

## 2024-08-16 PROCEDURE — 700105 HCHG RX REV CODE 258: Performed by: STUDENT IN AN ORGANIZED HEALTH CARE EDUCATION/TRAINING PROGRAM

## 2024-08-16 PROCEDURE — 99239 HOSP IP/OBS DSCHRG MGMT >30: CPT | Performed by: STUDENT IN AN ORGANIZED HEALTH CARE EDUCATION/TRAINING PROGRAM

## 2024-08-16 PROCEDURE — 700111 HCHG RX REV CODE 636 W/ 250 OVERRIDE (IP): Performed by: STUDENT IN AN ORGANIZED HEALTH CARE EDUCATION/TRAINING PROGRAM

## 2024-08-16 PROCEDURE — A9270 NON-COVERED ITEM OR SERVICE: HCPCS | Performed by: STUDENT IN AN ORGANIZED HEALTH CARE EDUCATION/TRAINING PROGRAM

## 2024-08-16 PROCEDURE — 85018 HEMOGLOBIN: CPT | Mod: 91

## 2024-08-16 PROCEDURE — 36415 COLL VENOUS BLD VENIPUNCTURE: CPT

## 2024-08-16 PROCEDURE — 85014 HEMATOCRIT: CPT | Mod: 91

## 2024-08-16 PROCEDURE — 99232 SBSQ HOSP IP/OBS MODERATE 35: CPT | Performed by: NURSE PRACTITIONER

## 2024-08-16 PROCEDURE — RXMED WILLOW AMBULATORY MEDICATION CHARGE: Performed by: STUDENT IN AN ORGANIZED HEALTH CARE EDUCATION/TRAINING PROGRAM

## 2024-08-16 RX ORDER — SIMETHICONE 125 MG
125 TABLET,CHEWABLE ORAL 3 TIMES DAILY PRN
Status: DISCONTINUED | OUTPATIENT
Start: 2024-08-16 | End: 2024-08-16 | Stop reason: HOSPADM

## 2024-08-16 RX ORDER — LOPERAMIDE HCL 2 MG
2 CAPSULE ORAL 4 TIMES DAILY PRN
Qty: 30 CAPSULE | Refills: 2 | Status: SHIPPED | OUTPATIENT
Start: 2024-08-16 | End: 2024-08-16

## 2024-08-16 RX ORDER — SUCRALFATE ORAL 1 G/10ML
1 SUSPENSION ORAL EVERY 6 HOURS
Qty: 420 ML | Refills: 3 | Status: SHIPPED | OUTPATIENT
Start: 2024-08-16

## 2024-08-16 RX ADMIN — SODIUM CHLORIDE, POTASSIUM CHLORIDE, SODIUM LACTATE AND CALCIUM CHLORIDE: 600; 310; 30; 20 INJECTION, SOLUTION INTRAVENOUS at 11:21

## 2024-08-16 RX ADMIN — SUCRALFATE 1 G: 1 SUSPENSION ORAL at 11:23

## 2024-08-16 RX ADMIN — SUCRALFATE 1 G: 1 SUSPENSION ORAL at 05:15

## 2024-08-16 RX ADMIN — FOLIC ACID 1 MG: 1 TABLET ORAL at 05:15

## 2024-08-16 RX ADMIN — SODIUM CHLORIDE 250 MG: 9 INJECTION, SOLUTION INTRAVENOUS at 05:17

## 2024-08-16 RX ADMIN — SUCRALFATE 1 G: 1 SUSPENSION ORAL at 00:04

## 2024-08-16 RX ADMIN — CYANOCOBALAMIN TAB 500 MCG 1000 MCG: 500 TAB at 05:15

## 2024-08-16 RX ADMIN — ACETAMINOPHEN 1000 MG: 500 TABLET ORAL at 06:00

## 2024-08-16 RX ADMIN — PANTOPRAZOLE SODIUM 40 MG: 40 INJECTION, POWDER, FOR SOLUTION INTRAVENOUS at 05:15

## 2024-08-16 ASSESSMENT — FIBROSIS 4 INDEX: FIB4 SCORE: 0.79

## 2024-08-16 ASSESSMENT — ENCOUNTER SYMPTOMS
DIZZINESS: 0
NAUSEA: 0
DIARRHEA: 0
WEIGHT LOSS: 1
BLURRED VISION: 0
FEVER: 0
CONSTIPATION: 0
CHILLS: 0
COUGH: 0
WEAKNESS: 1
HEARTBURN: 0
BLOOD IN STOOL: 0
ABDOMINAL PAIN: 1
DEPRESSION: 0
SHORTNESS OF BREATH: 0
VOMITING: 0
BACK PAIN: 0

## 2024-08-16 NOTE — DISCHARGE SUMMARY
Discharge Summary    CHIEF COMPLAINT ON ADMISSION  Chief Complaint   Patient presents with    Abdominal Pain    Vomiting       Reason for Admission  Abd Pain     Admission Date  8/14/2024    CODE STATUS  Full Code    HPI & HOSPITAL COURSE  Penelope Prince is a 73 y.o. female who presented on 8/14/2024 with abdominal pain and hematemesis.  This is a pleasant woman with a history of peptic ulcer disease, GERD, and arthritis, who recently underwent endoscopy by Dr. [facility] on 8/7/2024, after which the patient experienced bloating.  She presented to [facility] yesterday and was noted to have a hemoglobin of 9 and was subsequently discharged to home.  She returned to [facility] emergency room due to ongoing abdominal discomfort and bloating.  Patient reported taking her pantoprazole following her last endoscopy studies with continuing bloating symptoms and stomach discomfort.  Patient reported losing 40 pounds in the last 4 months.  No significant change in her bowel habits.  Last colonoscopy was over 5 years ago.      In the emergency room, repeat hemoglobin was 8.3 with MCV of 78.9.  CT scan of the abdomen and pelvis noted abnormal mucosal enhancement with probable wall thickening of gastric antral and proximal duodenum with possible outpouching/ulcer suggestive of gastroduodenitis/peptic ulcer disease.  1.4 cm indeterminate hypodensity along the falciform ligament.  No distended bowel or walled off perforation.    Patient was started on intravenous pantoprazole and admitted to the telemetry floor for further care evaluation.  Patient did report some melena with hemoglobin dropping to 7.1.  No clinical signs of hematemesis.  She had ongoing diarrhea with C. difficile testing being negative.  She was started on Imodium.    Gastroenterology was consulted.  Patient adamantly denied taking NSAIDs.  Biopsy/pathology results were reviewed, which showed  "ulcerated gastric pylorus type mucosa with granulation tissue and fibropurulent debris.  Negative for H. pylori.  Negative for intestinal metaplasia, dysplasia, or malignancy.    Per GI consultation note:  \"5/2024:  evaluated for dyspepsia, dysphagia, weight loss, anemia.    EGD: Empiric 54 French esophageal dilation 3 cm pyloric channel ulcer, duodenus 2nd-3rd portion distorted due to pylori channel ulcer  Colonoscopy to the terminal ileum, sigmoid diverticulosis, dark material suggestive of melena     With partial GOO, needs low fiber diet and small more frequent meals with liquids being better  No need for colonoscopy at this point\"    The gastroenterologist on-call did not recommend further endoscopic studies at this time.  Recommended low fiber diet with small frequent meals sitting upright in ambulating after each meal.  Recommended continue to take proton pump inhibitor on empty stomach first thing in the morning as well as taking super fate at least an hour after each medication and starting simethicone for gas pain.  Patient has been already scheduled for repeat endoscopic study will [facility] in October 2024.    Patient's hemoglobin trended up to 9.4-10.0 from 8.3 on admission not requiring any transfusion.    Therefore, she is discharged in good and stable condition to home with close outpatient follow-up.    The patient met 2-midnight criteria for an inpatient stay at the time of discharge.    Discharge Date  8/16/2024    FOLLOW UP ITEMS POST DISCHARGE  -Follow-up primary care provider in 3 to 5 days with labs.  -Follow-up with Dr. Doshi of [facility].    DISCHARGE DIAGNOSES  Principal Problem (Resolved):    Acute GI bleeding (POA: Yes)  Active Problems:    PUD (peptic ulcer disease) (POA: Yes)    Iron deficiency anemia due to chronic blood loss (POA: Yes)    ACP (advance care planning) (POA: Yes)      Overview: Preventative Health (Women):      Tetanus - 3/13 ? If tdap or td      Pneumovax " - none      Flu shot - 11/12      Stool - 2005      Pap Smear - 2006        Mammogram - 8/09      Breast Exam - does self      Dexa Scan - 8/09      Colonscopy - 2005 polyps REcheck 2010 feb - 7/5/13 doesn't know if new ins       pays for colonscopy.      Lipid Panel - 8/09, pending      CBC - 8/09      CMP - 8/09, pending      TSH - 8/09      Vitamin d - pending      UA - pending          GERD (gastroesophageal reflux disease) (POA: Yes)    Severe protein-calorie malnutrition (HCC) (POA: Yes)    Hypokalemia (POA: Yes)    Diarrhea (POA: Yes)  Resolved Problems:    Acute blood loss anemia (POA: Yes)    Dehydration (POA: Yes)      FOLLOW UP  No future appointments.  Franklin Doshi M.D.  [id number]  [id number] [phone]    Follow up      Pcp Pt States None            MEDICATIONS ON DISCHARGE     Medication List        START taking these medications        Instructions   omeprazole 20 MG delayed-release capsule  Commonly known as: PriLOSEC   Take 1 Capsule by mouth 2 times a day.  Dose: 20 mg            CHANGE how you take these medications        Instructions   sucralfate 1 GM/10ML Susp  What changed: when to take this  Commonly known as: Carafate   Take 10 mL by mouth every 6 hours.  Dose: 1 g            CONTINUE taking these medications        Instructions   fluticasone 50 MCG/ACT nasal spray  Commonly known as: Flonase   Administer 1 Spray into affected nostril(S) every day.  Dose: 1 Spray     ondansetron 4 MG Tbdp  Commonly known as: Zofran ODT   Take 4 mg by mouth every 8 hours as needed for Nausea/Vomiting.  Dose: 4 mg     PEG 3350 PO   Take 17 g by mouth every day.  Dose: 17 g            STOP taking these medications      famotidine 20 MG Tabs  Commonly known as: Pepcid     pantoprazole 40 MG Tbec  Commonly known as: Protonix              Allergies  Allergies   Allergen Reactions    Banana Unspecified          Onion Unspecified          Tomato Unspecified          Aleve  [Caitlin HINOJOSA-GARCIA O-Ek-Evzbdbq-Fa-Dha]      drowsiness       DIET  Orders Placed This Encounter   Procedures    Diet Order Diet: Low Fiber(GI Soft)     Standing Status:   Standing     Number of Occurrences:   1     Order Specific Question:   Diet:     Answer:   Low Fiber(GI Soft) [2]       ACTIVITY  As tolerated.  Weight bearing as tolerated    CONSULTATIONS  Gastroenterology    PROCEDURES  None    LABORATORY  Lab Results   Component Value Date    SODIUM 134 (L) 08/15/2024    POTASSIUM 3.7 08/15/2024    CHLORIDE 104 08/15/2024    CO2 24 08/15/2024    GLUCOSE 121 (H) 08/15/2024    BUN 6 (L) 08/15/2024    CREATININE 0.38 (L) 08/15/2024    CREATININE 0.85 08/05/2009    GLOMRATE >59 08/05/2009        Lab Results   Component Value Date    WBC 7.5 08/15/2024    WBC 5.0 08/05/2009    HEMOGLOBIN 9.4 (L) 08/16/2024    HEMATOCRIT 32.3 (L) 08/16/2024    PLATELETCT 347 08/15/2024     Total time of the discharge process 37 minutes.

## 2024-08-16 NOTE — PROGRESS NOTES
Prescriptions given to patient. Discharge instructions given to patient at bedside, verbalizes understanding and states plans for follow-up with PCP. New and home medication review, post-discharge activity level and worsening of symptoms needing follow-up care discussed. Telemetry monitor/IV cathlon removed. All belongings accounted for, all questions answered at this time. Patient escorted to son's car.

## 2024-08-16 NOTE — PROGRESS NOTES
..Gastroenterology Progress Note               Author:  Iwona Salomon, NAHID,  APRN Date & Time Created: 8/16/2024 8:07 AM       Patient ID:  Name:             Penelope Prince  YOB: 1950  Age:                 73 y.o.  female  MRN:               [id number]        Medical Decision Making, by Problem:  Active Hospital Problems    Diagnosis     Acute GI bleeding [K92.2]     Severe protein-calorie malnutrition (HCC) [E43]     Dehydration [E86.0]     Acute blood loss anemia [D62]     Iron deficiency anemia due to chronic blood loss [D50.0]     Hypokalemia [E87.6]     GERD (gastroesophageal reflux disease) [K21.9]     ACP (advance care planning) [Z71.89]      Presenting Chief Complaint:  History of Present Illness:    This is a very pleasant 73 y.o. female presented yesterday to the ER with complaint of left sided abdominal pain due to gas and vomiting.  She had an EGD 1 week ago by GI consultants which showed a healing ulcer in her stomach.  The pain has persisted but she describes it as gas.  She has lost almost 40 pounds in the last 4 months.  She has had a change in her bowel habits.  Last colonoscopy was 5+ years ago.     Hemoglobin 8.3, MCV 78.9.  She underwent CT abdomen pelvis with contrast showing abnormal mucosal enhancement probable wall thickening of the gastric antrum and proximal duodenum with possible outpouching/ulcers. 1.4 cm indeterminate hypodensity along the falciform ligament.  No distended bowel, no walled off perforation.    Interval History:  8/16/2024: Patient seen.  Still has persistent left lower quadrant abdominal pain but she says it is better than before.  Very concerned about what she can and cannot eat, also reports lots of gas type pain.  Also concerned about how and when she is supposed to take her GI medications.  Extensive discussion regarding dietary recommendations.  Had a normal bowel movement yesterday.  Hemoglobin 10    Hospital Medications:  Current  Facility-Administered Medications   Medication Dose Frequency Provider Last Rate Last Admin    pantoprazole (Protonix) injection 40 mg  40 mg BID Alo Jeffries M.D.   40 mg at 08/16/24 0515    loperamide (Imodium) capsule 2 mg  2 mg 4X/DAY PRN Alo Jeffries M.D.   2 mg at 08/15/24 1346    Pharmacy Consult Request ...Pain Management Review 1 Each  1 Each PHARMACY TO DOSE Alo Jeffries M.D.        acetaminophen (Tylenol) tablet 1,000 mg  1,000 mg Q6HRS Alo Jeffries M.D.   1,000 mg at 08/16/24 0600    Followed by    [START ON 8/20/2024] acetaminophen (Tylenol) tablet 1,000 mg  1,000 mg Q6HRS PRN Alo Jeffries M.D.        oxyCODONE immediate-release (Roxicodone) tablet 2.5 mg  2.5 mg Q3HRS PRN Alo Jeffries M.D.        Or    oxyCODONE immediate-release (Roxicodone) tablet 5 mg  5 mg Q3HRS PRN Alo Jeffries M.D.        Or    morphine 4 MG/ML injection 2 mg  2 mg Q3HRS PRN Alo Jeffries M.D.        acetaminophen (Tylenol) tablet 650 mg  650 mg Q6HRS PRN Alo Jeffries M.D.        ferric gluconate complex (Ferrlecit) 250 mg in  mL IVPB  250 mg BID Alo Jeffries M.D.   Stopped at 08/16/24 0617    LR (Bolus) infusion 500 mL  500 mL Once PRN Pranav Giang M.D.        lactated ringers infusion   Continuous Pranav Giang M.D. 83 mL/hr at 08/15/24 1833 New Bag at 08/15/24 1833    labetalol (Normodyne/Trandate) injection 10 mg  10 mg Q4HRS PRN Pranav Giang M.D.        ondansetron (Zofran) syringe/vial injection 4 mg  4 mg Q4HRS PRN Pranav Giang M.D.        ondansetron (Zofran ODT) dispertab 4 mg  4 mg Q4HRS PRN Pranav Giang M.D.        sucralfate (Carafate) 1 GM/10ML suspension 1 g  1 g Q6HRS Pranav Giang M.D.   1 g at 08/16/24 0515    cyanocobalamin (Vitamin B-12) tablet 1,000 mcg  1,000 mcg DAILY Pranav Giang M.D.   1,000 mcg at 08/16/24 0515    folic acid (Folvite) tablet 1 mg  1 mg BID Pranav Giang M.D.   1 mg at 08/16/24 0515   Last reviewed on 8/14/2024  7:54 PM by Satish Gilbert PhT       Review of Systems:  Review of  Systems   Constitutional:  Positive for weight loss. Negative for chills, fever and malaise/fatigue.   HENT:  Negative for hearing loss.    Eyes:  Negative for blurred vision.   Respiratory:  Negative for cough and shortness of breath.    Cardiovascular:  Negative for chest pain and leg swelling.   Gastrointestinal:  Positive for abdominal pain. Negative for blood in stool, constipation, diarrhea, heartburn, melena, nausea and vomiting.   Genitourinary:  Negative for dysuria.   Musculoskeletal:  Negative for back pain.   Skin:  Negative for rash.   Neurological:  Positive for weakness. Negative for dizziness.   Psychiatric/Behavioral:  Negative for depression.    All other systems reviewed and are negative.        Vital signs:  Weight/BMI: Body mass index is 16.4 kg/m².  /61   Pulse 65   Temp 36.5 °C (97.7 °F) (Temporal)   Resp 17   Ht 1.524 m (5')   Wt 38.1 kg (83 lb 15.9 oz)   SpO2 99%   Vitals:    08/15/24 1949 08/16/24 0051 08/16/24 0350 08/16/24 0740   BP: 106/58 109/56 108/58 106/61   Pulse: (!) 55 62 60 65   Resp: 18 18 16 17   Temp:    36.5 °C (97.7 °F)   TempSrc: Temporal Temporal Temporal Temporal   SpO2: 100% 98% 99% 99%   Weight:   38.1 kg (83 lb 15.9 oz)    Height:         Oxygen Therapy:  Pulse Oximetry: 99 %, O2 (LPM): 0, O2 Delivery Device: None - Room Air    Intake/Output Summary (Last 24 hours) at 8/16/2024 0807  Last data filed at 8/16/2024 0020  Gross per 24 hour   Intake 120 ml   Output 3 ml   Net 117 ml       Physical Exam:  Physical Exam  Vitals and nursing note reviewed.   Constitutional:       General: She is not in acute distress.     Appearance: She is ill-appearing.      Comments: Frail and thin   HENT:      Head: Normocephalic and atraumatic.      Right Ear: External ear normal.      Left Ear: External ear normal.      Nose: Nose normal.      Mouth/Throat:      Mouth: Mucous membranes are moist.      Pharynx: Oropharynx is clear.   Eyes:      General: No scleral  icterus.  Cardiovascular:      Rate and Rhythm: Normal rate and regular rhythm.      Pulses: Normal pulses.      Heart sounds: Normal heart sounds.   Pulmonary:      Effort: Pulmonary effort is normal. No respiratory distress.      Breath sounds: Normal breath sounds.   Abdominal:      General: Abdomen is flat. Bowel sounds are normal. There is no distension.      Palpations: Abdomen is soft.      Tenderness: There is abdominal tenderness.   Musculoskeletal:         General: Normal range of motion.      Cervical back: Normal range of motion.   Skin:     General: Skin is warm and dry.      Capillary Refill: Capillary refill takes less than 2 seconds.      Coloration: Skin is pale.   Neurological:      Mental Status: She is alert and oriented to person, place, and time.   Psychiatric:         Mood and Affect: Mood normal.         Behavior: Behavior normal.       Labs:  Recent Labs     08/14/24  1631 08/15/24  0104   SODIUM 140 134*   POTASSIUM 3.1* 3.7   CHLORIDE 101 104   CO2 27 24   BUN 9 6*   CREATININE 0.41* 0.38*   MAGNESIUM 2.1 2.4   PHOSPHORUS  --  3.0   CALCIUM 8.9 7.7*     Recent Labs     08/14/24  1631 08/15/24  0104   ALTSGPT 16 12   ASTSGOT 18 13   ALKPHOSPHAT 69 63   TBILIRUBIN 0.4 0.3   LIPASE 16  --    GLUCOSE 115* 121*     Recent Labs     08/14/24  1631 08/15/24  0104   WBC 9.1 7.5   NEUTSPOLYS 85.90* 81.00*   LYMPHOCYTES 5.30* 10.10*   MONOCYTES 8.10 7.60   EOSINOPHILS 0.00 0.40   BASOPHILS 0.40 0.50   ASTSGOT 18 13   ALTSGPT 16 12   ALKPHOSPHAT 69 63   TBILIRUBIN 0.4 0.3     Recent Labs     08/14/24  1631 08/15/24  0104 08/15/24  1147 08/15/24  1823 08/16/24  0006   RBC 3.61* 3.15*  --   --   --    HEMOGLOBIN 8.3* 7.2* 7.1* 7.3* 7.8*   HEMATOCRIT 28.5* 25.1* 24.9* 26.0* 26.7*   PLATELETCT 393 347  --   --   --    PROTHROMBTM 14.3  --   --   --   --    INR 1.10  --   --   --   --    IRON 17* 13*  --   --   --    FERRITIN 18.1  --   --   --   --    [facility] 288 546*  --   --   --      Recent Results  (from the past 24 hour(s))   OCCULT BLOOD STOOL    Collection Time: 08/15/24  8:40 AM   Result Value Ref Range    Occult Blood Feces Positive (A) Negative   C Diff by PCR rflx Toxin    Collection Time: 08/15/24  9:46 AM    Specimen: Stool   Result Value Ref Range    C Diff by PCR Negative Negative    [id number] Presumptive Negative Negative   VITAMIN B12    Collection Time: 08/15/24 11:47 AM   Result Value Ref Range    Vitamin B12 -True Cobalamin 727 211 - 911 pg/mL   HEMOGLOBIN AND HEMATOCRIT    Collection Time: 08/15/24 11:47 AM   Result Value Ref Range    Hemoglobin 7.1 (L) 12.0 - 16.0 g/dL    Hematocrit 24.9 (L) 37.0 - 47.0 %   VITAMIN B12    Collection Time: 08/15/24  6:23 PM   Result Value Ref Range    Vitamin B12 -True Cobalamin 671 211 - 911 pg/mL   HEMOGLOBIN AND HEMATOCRIT    Collection Time: 08/15/24  6:23 PM   Result Value Ref Range    Hemoglobin 7.3 (L) 12.0 - 16.0 g/dL    Hematocrit 26.0 (L) 37.0 - 47.0 %   HEMOGLOBIN AND HEMATOCRIT    Collection Time: 08/16/24 12:06 AM   Result Value Ref Range    Hemoglobin 7.8 (L) 12.0 - 16.0 g/dL    Hematocrit 26.7 (L) 37.0 - 47.0 %       Radiology Review:  CT-ABDOMEN-PELVIS WITH   Final Result      Abnormal mucosal enhancement and probable wall thickening of the gastric antrum and proximal duodenum with possible outpouching/ulcers suggesting gastroduodenitis/peptic ulcer disease. Correlate with endoscopy.      No definite free air      Colonic diverticulosis.      DX-CHEST-PORTABLE (1 VIEW)   Final Result      No acute cardiac or pulmonary abnormalities are identified.            MDM (Data Review):   -Records reviewed and summarized in current documentation  -I personally reviewed and interpreted the laboratory results  -I personally reviewed the radiology images    Assessment/Recommendations:  mpression:   Left sided abdominal pain, predominantly LLQ, more than right sided   Unintentional weight loss   Microcytic anemia/iron deficiency   Heme positive stool    Protein calorie malnutrition   History of gastric and duodenal ulcer.  Need endoscopy report   History of pyloric stenosis, s/p dlation     Recommendations:  Have reached out to her GI physician about recent EGD report  No worrisome findings on CT      Addendum:  [facility]     8/7/24 EGD:    Esophagus: normal  Stomach:  retained food; clean based, cratered non bleeding 20mm ulcer in pylorus, biopsied  Duodenum: benign intrinsic stricture distal bulb, could not traverse with endoscope     7/2024 GI Consult at hospital.  Pt c/o nausea, dyspepsia and gas     5/2024:  evaluated for dyspepsia, dysphagia, weight loss, anemia.    EGD: Empiric 54 [place] esophageal dilation 3 cm pyloric channel ulcer, duodenus 2nd-3rd portion distorted due to pylori channel ulcer  Colonoscopy to the terminal ileum, sigmoid diverticulosis, dark material suggestive of melena     With partial GOO, needs low fiber diet and small more frequent meals with liquids being better  No need for colonoscopy at this point    8/16/2024: Path received from [facility].  Diagnosis reveals ulcerated gastric pylorus type mucosa with granulation tissue and fibrinopurulent debris.  Negative for H. pylori.  Negative for intestinal metaplasia, dysplasia, or malignancy.    She adamantly denies taking NSAIDs.  Discussed at length low fiber diet with small frequent meals, sitting upright, and ambulating after each meal  Recommended continuing to take her PPI on empty stomach first thing in the morning  She understands to take her sucralfate at least an hour after each medication  Also started her on simethicone for gas pain  She will follow-up with [facility] as previously arranged for repeat endoscopy in October  All questions answered    Discharging home today    Discussed with patient, RN, Dr. Jeffries, Dr. Contreras    Core Quality Measures   Reviewed items::  Labs, Medications and Radiology reports reviewed

## 2024-08-16 NOTE — PROGRESS NOTES
"1900: Bedside report received, assumed care of patient. Resting in bed, denied pain. A&O x4. Tele monitoring in place. Educated on fall risk, all fall precautions in place. Call light within reach, bed locked and in lowest position. RN was SBA while patient used restroom. No other needs at this time.     2000: Patient states they are having \"difficulty breathing\", and would like \"something\" to help. RN assessed the patient's O2 which was %, and asked the patient to describe what she was feeling in more detail. The patient stated that she was feeling \"congested from wearing her glasses\", and kept gesturing to her cheeks. The RN clarified if she would like a decongestant and the patient stated she takes a nasal spray at home to help. RN reached out to Karissa VILLAR, but patient stated that she was feeling much more relieved and that she just needed to \"calm\" herself down. Patient stated she did not need anything anymore and that she would have her family bring in her nasal spray. This RN reinforced that if her family brought in home medication that the pharmacy and provider would need to approve it, and that she should not take any medications without the RN knowing. Patient expressed understanding. Declines other needs at this time.   "

## 2024-08-16 NOTE — PROGRESS NOTES
Monitor Summary  Rhythm: SB/SR  Rate: 52-81  Ectopy: rare pac's, rare pvc's  Measurements: 0.13/0.08/0.38

## 2024-08-16 NOTE — PROGRESS NOTES
CCT assisted patient to bathroom. On way back to bed patient became dizzy and weak. BP was taken. Notified MD of soft BP 86/54.

## 2024-08-16 NOTE — PROGRESS NOTES
Monitor Summary  Rhythm: SB/SR  Rate: 55-85  Ectopy: (R) PAC  Measurements: .13/.08/.42  ---12 hr Chart Review---

## 2024-08-16 NOTE — PROGRESS NOTES
MD said to encourage oral fluids to help stabilize her BP. Instructed patient. Will come back and reassess BP.

## 2024-08-16 NOTE — CARE PLAN
The patient is Watcher - Medium risk of patient condition declining or worsening    Shift Goals  Clinical Goals: Monitor H&H, S&S of bleeding  Patient Goals: Comfort; rest  Family Goals: DONNA    Progress made toward(s) clinical / shift goals:    Problem: Pain - Standard  Goal: Alleviation of pain or a reduction in pain to the patient’s comfort goal  Outcome: Progressing     Problem: Provide Safe Environment  Goal: Suicide environmental safety, protocols, policies, and practices will be implemented  Outcome: Progressing     Problem: Hemodynamics  Goal: Patient's hemodynamics, fluid balance and neurologic status will be stable or improve  Outcome: Progressing     Problem: Fluid Volume  Goal: Fluid volume balance will be maintained  Outcome: Progressing     Problem: Respiratory  Goal: Patient will achieve/maintain optimum respiratory ventilation and gas exchange  Outcome: Progressing     Problem: Fall Risk  Goal: Patient will remain free from falls  Outcome: Progressing       Patient is not progressing towards the following goals:

## 2024-08-16 NOTE — PROGRESS NOTES
8/16/2024      To whom it may concern:    Penelope Prince was hospitalized at [facility] from 8/14/2024 to 8/16/2024.  Please excuse her son Maxwell Prince from work to supervisor mother for the next couple days.    Respectfully,        Alo Jeffries MD  Hospitalist

## 2024-08-19 LAB — HAPTOGLOB SERPL-MCNC: 131 MG/DL (ref 30–200)

## 2024-08-20 ENCOUNTER — TELEPHONE (OUTPATIENT)
Dept: HEALTH INFORMATION MANAGEMENT | Facility: OTHER | Age: 74
End: 2024-08-20
Payer: MEDICARE

## 2024-08-20 PROBLEM — E87.1 HYPONATREMIA: Status: ACTIVE | Noted: 2024-08-20

## 2024-10-14 ENCOUNTER — APPOINTMENT (OUTPATIENT)
Dept: RADIOLOGY | Facility: MEDICAL CENTER | Age: 74
DRG: 380 | End: 2024-10-14
Attending: HOSPITALIST
Payer: MEDICARE

## 2024-10-14 ENCOUNTER — APPOINTMENT (OUTPATIENT)
Dept: RADIOLOGY | Facility: MEDICAL CENTER | Age: 74
DRG: 380 | End: 2024-10-14
Attending: STUDENT IN AN ORGANIZED HEALTH CARE EDUCATION/TRAINING PROGRAM
Payer: MEDICARE

## 2024-10-14 ENCOUNTER — HOSPITAL ENCOUNTER (INPATIENT)
Facility: MEDICAL CENTER | Age: 74
LOS: 3 days | DRG: 380 | End: 2024-10-17
Attending: STUDENT IN AN ORGANIZED HEALTH CARE EDUCATION/TRAINING PROGRAM | Admitting: HOSPITALIST
Payer: MEDICARE

## 2024-10-14 DIAGNOSIS — K27.9 PUD (PEPTIC ULCER DISEASE): ICD-10-CM

## 2024-10-14 DIAGNOSIS — R11.2 INTRACTABLE NAUSEA AND VOMITING: ICD-10-CM

## 2024-10-14 DIAGNOSIS — K31.1 GASTRIC OUTLET OBSTRUCTION: ICD-10-CM

## 2024-10-14 LAB
ALBUMIN SERPL BCP-MCNC: 3.6 G/DL (ref 3.2–4.9)
ALBUMIN/GLOB SERPL: 1.6 G/DL
ALP SERPL-CCNC: 73 U/L (ref 30–99)
ALT SERPL-CCNC: 19 U/L (ref 2–50)
ANION GAP SERPL CALC-SCNC: 12 MMOL/L (ref 7–16)
APPEARANCE UR: ABNORMAL
AST SERPL-CCNC: 18 U/L (ref 12–45)
BACTERIA #/AREA URNS HPF: NEGATIVE /HPF
BASOPHILS # BLD AUTO: 0.8 % (ref 0–1.8)
BASOPHILS # BLD: 0.04 K/UL (ref 0–0.12)
BILIRUB SERPL-MCNC: 0.4 MG/DL (ref 0.1–1.5)
BILIRUB UR QL STRIP.AUTO: NEGATIVE
BUN SERPL-MCNC: 10 MG/DL (ref 8–22)
CALCIUM ALBUM COR SERPL-MCNC: 9.4 MG/DL (ref 8.5–10.5)
CALCIUM SERPL-MCNC: 9.1 MG/DL (ref 8.5–10.5)
CHLORIDE SERPL-SCNC: 97 MMOL/L (ref 96–112)
CO2 SERPL-SCNC: 26 MMOL/L (ref 20–33)
COLOR UR: YELLOW
CREAT SERPL-MCNC: 0.54 MG/DL (ref 0.5–1.4)
EOSINOPHIL # BLD AUTO: 0.08 K/UL (ref 0–0.51)
EOSINOPHIL NFR BLD: 1.6 % (ref 0–6.9)
EPI CELLS #/AREA URNS HPF: ABNORMAL /HPF
ERYTHROCYTE [DISTWIDTH] IN BLOOD BY AUTOMATED COUNT: 57 FL (ref 35.9–50)
GFR SERPLBLD CREATININE-BSD FMLA CKD-EPI: 97 ML/MIN/1.73 M 2
GLOBULIN SER CALC-MCNC: 2.2 G/DL (ref 1.9–3.5)
GLUCOSE SERPL-MCNC: 107 MG/DL (ref 65–99)
GLUCOSE UR STRIP.AUTO-MCNC: NEGATIVE MG/DL
HCT VFR BLD AUTO: 35.2 % (ref 37–47)
HGB BLD-MCNC: 11.3 G/DL (ref 12–16)
HYALINE CASTS #/AREA URNS LPF: ABNORMAL /LPF
IMM GRANULOCYTES # BLD AUTO: 0.03 K/UL (ref 0–0.11)
IMM GRANULOCYTES NFR BLD AUTO: 0.6 % (ref 0–0.9)
KETONES UR STRIP.AUTO-MCNC: NEGATIVE MG/DL
LACTATE SERPL-SCNC: 1.1 MMOL/L (ref 0.5–2)
LEUKOCYTE ESTERASE UR QL STRIP.AUTO: ABNORMAL
LIPASE SERPL-CCNC: 47 U/L (ref 11–82)
LYMPHOCYTES # BLD AUTO: 0.72 K/UL (ref 1–4.8)
LYMPHOCYTES NFR BLD: 14.3 % (ref 22–41)
MAGNESIUM SERPL-MCNC: 1.8 MG/DL (ref 1.5–2.5)
MCH RBC QN AUTO: 25.6 PG (ref 27–33)
MCHC RBC AUTO-ENTMCNC: 32.1 G/DL (ref 32.2–35.5)
MCV RBC AUTO: 79.8 FL (ref 81.4–97.8)
MICRO URNS: ABNORMAL
MONOCYTES # BLD AUTO: 0.43 K/UL (ref 0–0.85)
MONOCYTES NFR BLD AUTO: 8.6 % (ref 0–13.4)
NEUTROPHILS # BLD AUTO: 3.72 K/UL (ref 1.82–7.42)
NEUTROPHILS NFR BLD: 74.1 % (ref 44–72)
NITRITE UR QL STRIP.AUTO: NEGATIVE
NRBC # BLD AUTO: 0 K/UL
NRBC BLD-RTO: 0 /100 WBC (ref 0–0.2)
PH UR STRIP.AUTO: 8 [PH] (ref 5–8)
PHOSPHATE SERPL-MCNC: 2.9 MG/DL (ref 2.5–4.5)
PLATELET # BLD AUTO: 411 K/UL (ref 164–446)
PMV BLD AUTO: 9.1 FL (ref 9–12.9)
POTASSIUM SERPL-SCNC: 3.8 MMOL/L (ref 3.6–5.5)
PROT SERPL-MCNC: 5.8 G/DL (ref 6–8.2)
PROT UR QL STRIP: NEGATIVE MG/DL
RBC # BLD AUTO: 4.41 M/UL (ref 4.2–5.4)
RBC # URNS HPF: ABNORMAL /HPF
RBC UR QL AUTO: NEGATIVE
SODIUM SERPL-SCNC: 135 MMOL/L (ref 135–145)
SP GR UR STRIP.AUTO: 1.01
UROBILINOGEN UR STRIP.AUTO-MCNC: 0.2 MG/DL
WBC # BLD AUTO: 5 K/UL (ref 4.8–10.8)
WBC #/AREA URNS HPF: ABNORMAL /HPF

## 2024-10-14 PROCEDURE — 304538 HCHG NG TUBE

## 2024-10-14 PROCEDURE — 96374 THER/PROPH/DIAG INJ IV PUSH: CPT

## 2024-10-14 PROCEDURE — 83690 ASSAY OF LIPASE: CPT

## 2024-10-14 PROCEDURE — 96375 TX/PRO/DX INJ NEW DRUG ADDON: CPT

## 2024-10-14 PROCEDURE — 36415 COLL VENOUS BLD VENIPUNCTURE: CPT

## 2024-10-14 PROCEDURE — 700111 HCHG RX REV CODE 636 W/ 250 OVERRIDE (IP)

## 2024-10-14 PROCEDURE — 99223 1ST HOSP IP/OBS HIGH 75: CPT | Mod: AI | Performed by: HOSPITALIST

## 2024-10-14 PROCEDURE — A9270 NON-COVERED ITEM OR SERVICE: HCPCS | Performed by: STUDENT IN AN ORGANIZED HEALTH CARE EDUCATION/TRAINING PROGRAM

## 2024-10-14 PROCEDURE — 700117 HCHG RX CONTRAST REV CODE 255: Performed by: STUDENT IN AN ORGANIZED HEALTH CARE EDUCATION/TRAINING PROGRAM

## 2024-10-14 PROCEDURE — 85025 COMPLETE CBC W/AUTO DIFF WBC: CPT

## 2024-10-14 PROCEDURE — 80053 COMPREHEN METABOLIC PANEL: CPT

## 2024-10-14 PROCEDURE — 700102 HCHG RX REV CODE 250 W/ 637 OVERRIDE(OP): Performed by: STUDENT IN AN ORGANIZED HEALTH CARE EDUCATION/TRAINING PROGRAM

## 2024-10-14 PROCEDURE — 770001 HCHG ROOM/CARE - MED/SURG/GYN PRIV*

## 2024-10-14 PROCEDURE — 700101 HCHG RX REV CODE 250: Performed by: STUDENT IN AN ORGANIZED HEALTH CARE EDUCATION/TRAINING PROGRAM

## 2024-10-14 PROCEDURE — 96376 TX/PRO/DX INJ SAME DRUG ADON: CPT

## 2024-10-14 PROCEDURE — 81001 URINALYSIS AUTO W/SCOPE: CPT

## 2024-10-14 PROCEDURE — 84100 ASSAY OF PHOSPHORUS: CPT

## 2024-10-14 PROCEDURE — 83735 ASSAY OF MAGNESIUM: CPT

## 2024-10-14 PROCEDURE — 83605 ASSAY OF LACTIC ACID: CPT

## 2024-10-14 PROCEDURE — 99285 EMERGENCY DEPT VISIT HI MDM: CPT

## 2024-10-14 PROCEDURE — 74177 CT ABD & PELVIS W/CONTRAST: CPT

## 2024-10-14 PROCEDURE — 700105 HCHG RX REV CODE 258: Performed by: HOSPITALIST

## 2024-10-14 PROCEDURE — 700111 HCHG RX REV CODE 636 W/ 250 OVERRIDE (IP): Performed by: STUDENT IN AN ORGANIZED HEALTH CARE EDUCATION/TRAINING PROGRAM

## 2024-10-14 RX ORDER — ONDANSETRON 4 MG/1
4 TABLET, ORALLY DISINTEGRATING ORAL ONCE
Status: COMPLETED | OUTPATIENT
Start: 2024-10-14 | End: 2024-10-14

## 2024-10-14 RX ORDER — LIDOCAINE HYDROCHLORIDE 20 MG/ML
JELLY TOPICAL
Status: COMPLETED | OUTPATIENT
Start: 2024-10-14 | End: 2024-10-14

## 2024-10-14 RX ORDER — ONDANSETRON 2 MG/ML
4 INJECTION INTRAMUSCULAR; INTRAVENOUS EVERY 4 HOURS PRN
Status: DISCONTINUED | OUTPATIENT
Start: 2024-10-14 | End: 2024-10-17 | Stop reason: HOSPADM

## 2024-10-14 RX ORDER — PANTOPRAZOLE SODIUM 40 MG/10ML
40 INJECTION, POWDER, LYOPHILIZED, FOR SOLUTION INTRAVENOUS DAILY
Status: DISCONTINUED | OUTPATIENT
Start: 2024-10-15 | End: 2024-10-17 | Stop reason: HOSPADM

## 2024-10-14 RX ORDER — MORPHINE SULFATE 4 MG/ML
4 INJECTION INTRAVENOUS ONCE
Status: COMPLETED | OUTPATIENT
Start: 2024-10-14 | End: 2024-10-14

## 2024-10-14 RX ORDER — THIAMINE HYDROCHLORIDE 100 MG/ML
100 INJECTION, SOLUTION INTRAMUSCULAR; INTRAVENOUS DAILY
Status: COMPLETED | OUTPATIENT
Start: 2024-10-14 | End: 2024-10-17

## 2024-10-14 RX ORDER — MORPHINE SULFATE 4 MG/ML
2 INJECTION INTRAVENOUS ONCE
Status: COMPLETED | OUTPATIENT
Start: 2024-10-14 | End: 2024-10-14

## 2024-10-14 RX ORDER — SODIUM CHLORIDE, SODIUM LACTATE, POTASSIUM CHLORIDE, CALCIUM CHLORIDE 600; 310; 30; 20 MG/100ML; MG/100ML; MG/100ML; MG/100ML
INJECTION, SOLUTION INTRAVENOUS CONTINUOUS
Status: DISCONTINUED | OUTPATIENT
Start: 2024-10-14 | End: 2024-10-15

## 2024-10-14 RX ORDER — PANTOPRAZOLE SODIUM 40 MG/10ML
80 INJECTION, POWDER, LYOPHILIZED, FOR SOLUTION INTRAVENOUS ONCE
Status: COMPLETED | OUTPATIENT
Start: 2024-10-14 | End: 2024-10-14

## 2024-10-14 RX ORDER — ONDANSETRON 4 MG/1
4 TABLET, ORALLY DISINTEGRATING ORAL EVERY 4 HOURS PRN
Status: DISCONTINUED | OUTPATIENT
Start: 2024-10-14 | End: 2024-10-17 | Stop reason: HOSPADM

## 2024-10-14 RX ADMIN — ONDANSETRON 4 MG: 4 TABLET, ORALLY DISINTEGRATING ORAL at 17:29

## 2024-10-14 RX ADMIN — PANTOPRAZOLE SODIUM 80 MG: 40 INJECTION, POWDER, FOR SOLUTION INTRAVENOUS at 18:45

## 2024-10-14 RX ADMIN — MORPHINE SULFATE 2 MG: 4 INJECTION, SOLUTION INTRAMUSCULAR; INTRAVENOUS at 18:41

## 2024-10-14 RX ADMIN — MORPHINE SULFATE 4 MG: 4 INJECTION, SOLUTION INTRAMUSCULAR; INTRAVENOUS at 21:48

## 2024-10-14 RX ADMIN — LIDOCAINE HYDROCHLORIDE 2 %: 20 JELLY TOPICAL at 21:57

## 2024-10-14 RX ADMIN — IOHEXOL 65 ML: 350 INJECTION, SOLUTION INTRAVENOUS at 20:30

## 2024-10-14 RX ADMIN — SODIUM CHLORIDE, POTASSIUM CHLORIDE, SODIUM LACTATE AND CALCIUM CHLORIDE: 600; 310; 30; 20 INJECTION, SOLUTION INTRAVENOUS at 22:30

## 2024-10-14 RX ADMIN — LIDOCAINE HYDROCHLORIDE 30 ML: 20 SOLUTION ORAL; TOPICAL at 18:48

## 2024-10-14 ASSESSMENT — LIFESTYLE VARIABLES
SUBSTANCE_ABUSE: 0
EVER FELT BAD OR GUILTY ABOUT YOUR DRINKING: NO
ALCOHOL_USE: NO
AVERAGE NUMBER OF DAYS PER WEEK YOU HAVE A DRINK CONTAINING ALCOHOL: 0
CONSUMPTION TOTAL: NEGATIVE
TOTAL SCORE: 0
DOES PATIENT WANT TO STOP DRINKING: NO
EVER HAD A DRINK FIRST THING IN THE MORNING TO STEADY YOUR NERVES TO GET RID OF A HANGOVER: NO
TOTAL SCORE: 0
TOTAL SCORE: 0
ON A TYPICAL DAY WHEN YOU DRINK ALCOHOL HOW MANY DRINKS DO YOU HAVE: 0
HAVE PEOPLE ANNOYED YOU BY CRITICIZING YOUR DRINKING: NO
HOW MANY TIMES IN THE PAST YEAR HAVE YOU HAD 5 OR MORE DRINKS IN A DAY: 0
HAVE YOU EVER FELT YOU SHOULD CUT DOWN ON YOUR DRINKING: NO

## 2024-10-14 ASSESSMENT — ENCOUNTER SYMPTOMS
BRUISES/BLEEDS EASILY: 0
WEAKNESS: 0
EYE PAIN: 0
PND: 0
CHILLS: 0
DIZZINESS: 0
ORTHOPNEA: 0
DOUBLE VISION: 0
TREMORS: 0
WHEEZING: 0
SINUS PAIN: 0
SORE THROAT: 0
BLURRED VISION: 0
VOMITING: 1
PALPITATIONS: 0
STRIDOR: 0
SPUTUM PRODUCTION: 0
FALLS: 0
HEMOPTYSIS: 0
CLAUDICATION: 0
FEVER: 0
ABDOMINAL PAIN: 1
POLYDIPSIA: 0
CONSTIPATION: 0
HEARTBURN: 0
DIAPHORESIS: 0
COUGH: 0
NECK PAIN: 0
SHORTNESS OF BREATH: 0
BLOOD IN STOOL: 0
NAUSEA: 1
MYALGIAS: 0
HALLUCINATIONS: 0
PHOTOPHOBIA: 0
HEADACHES: 0
TINGLING: 0
DEPRESSION: 0
DIARRHEA: 0
BACK PAIN: 0
FLANK PAIN: 0

## 2024-10-14 ASSESSMENT — FIBROSIS 4 INDEX
FIB4 SCORE: 0.79
FIB4 SCORE: 0.79

## 2024-10-14 ASSESSMENT — COGNITIVE AND FUNCTIONAL STATUS - GENERAL
MOBILITY SCORE: 24
DAILY ACTIVITIY SCORE: 24
SUGGESTED CMS G CODE MODIFIER DAILY ACTIVITY: CH
SUGGESTED CMS G CODE MODIFIER MOBILITY: CH

## 2024-10-14 ASSESSMENT — SOCIAL DETERMINANTS OF HEALTH (SDOH)
WITHIN THE PAST 12 MONTHS, YOU WORRIED THAT YOUR FOOD WOULD RUN OUT BEFORE YOU GOT THE MONEY TO BUY MORE: NEVER TRUE
WITHIN THE PAST 12 MONTHS, THE FOOD YOU BOUGHT JUST DIDN'T LAST AND YOU DIDN'T HAVE MONEY TO GET MORE: NEVER TRUE
WITHIN THE LAST YEAR, HAVE TO BEEN RAPED OR FORCED TO HAVE ANY KIND OF SEXUAL ACTIVITY BY YOUR PARTNER OR EX-PARTNER?: NO
WITHIN THE LAST YEAR, HAVE YOU BEEN HUMILIATED OR EMOTIONALLY ABUSED IN OTHER WAYS BY YOUR PARTNER OR EX-PARTNER?: NO
WITHIN THE LAST YEAR, HAVE YOU BEEN KICKED, HIT, SLAPPED, OR OTHERWISE PHYSICALLY HURT BY YOUR PARTNER OR EX-PARTNER?: NO
IN THE PAST 12 MONTHS, HAS THE ELECTRIC, GAS, OIL, OR WATER COMPANY THREATENED TO SHUT OFF SERVICE IN YOUR HOME?: NO
WITHIN THE LAST YEAR, HAVE YOU BEEN AFRAID OF YOUR PARTNER OR EX-PARTNER?: NO

## 2024-10-14 ASSESSMENT — PATIENT HEALTH QUESTIONNAIRE - PHQ9
1. LITTLE INTEREST OR PLEASURE IN DOING THINGS: NOT AT ALL
SUM OF ALL RESPONSES TO PHQ9 QUESTIONS 1 AND 2: 0
2. FEELING DOWN, DEPRESSED, IRRITABLE, OR HOPELESS: NOT AT ALL

## 2024-10-14 ASSESSMENT — PAIN DESCRIPTION - PAIN TYPE: TYPE: ACUTE PAIN

## 2024-10-15 LAB
ALBUMIN SERPL BCP-MCNC: 3 G/DL (ref 3.2–4.9)
ALBUMIN/GLOB SERPL: 1.4 G/DL
ALP SERPL-CCNC: 63 U/L (ref 30–99)
ALT SERPL-CCNC: 13 U/L (ref 2–50)
ANION GAP SERPL CALC-SCNC: 7 MMOL/L (ref 7–16)
AST SERPL-CCNC: 13 U/L (ref 12–45)
BASOPHILS # BLD AUTO: 0.9 % (ref 0–1.8)
BASOPHILS # BLD: 0.04 K/UL (ref 0–0.12)
BILIRUB SERPL-MCNC: 0.3 MG/DL (ref 0.1–1.5)
BUN SERPL-MCNC: 8 MG/DL (ref 8–22)
CALCIUM ALBUM COR SERPL-MCNC: 8.9 MG/DL (ref 8.5–10.5)
CALCIUM SERPL-MCNC: 8.1 MG/DL (ref 8.5–10.5)
CHLORIDE SERPL-SCNC: 103 MMOL/L (ref 96–112)
CO2 SERPL-SCNC: 25 MMOL/L (ref 20–33)
CREAT SERPL-MCNC: 0.45 MG/DL (ref 0.5–1.4)
EKG IMPRESSION: NORMAL
EOSINOPHIL # BLD AUTO: 0.16 K/UL (ref 0–0.51)
EOSINOPHIL NFR BLD: 3.5 % (ref 0–6.9)
ERYTHROCYTE [DISTWIDTH] IN BLOOD BY AUTOMATED COUNT: 57.5 FL (ref 35.9–50)
GFR SERPLBLD CREATININE-BSD FMLA CKD-EPI: 101 ML/MIN/1.73 M 2
GLOBULIN SER CALC-MCNC: 2.2 G/DL (ref 1.9–3.5)
GLUCOSE SERPL-MCNC: 87 MG/DL (ref 65–99)
HCT VFR BLD AUTO: 33.1 % (ref 37–47)
HGB BLD-MCNC: 10.1 G/DL (ref 12–16)
IMM GRANULOCYTES # BLD AUTO: 0.01 K/UL (ref 0–0.11)
IMM GRANULOCYTES NFR BLD AUTO: 0.2 % (ref 0–0.9)
LYMPHOCYTES # BLD AUTO: 0.89 K/UL (ref 1–4.8)
LYMPHOCYTES NFR BLD: 19.6 % (ref 22–41)
MCH RBC QN AUTO: 24.6 PG (ref 27–33)
MCHC RBC AUTO-ENTMCNC: 30.5 G/DL (ref 32.2–35.5)
MCV RBC AUTO: 80.7 FL (ref 81.4–97.8)
MONOCYTES # BLD AUTO: 0.44 K/UL (ref 0–0.85)
MONOCYTES NFR BLD AUTO: 9.7 % (ref 0–13.4)
NEUTROPHILS # BLD AUTO: 3.01 K/UL (ref 1.82–7.42)
NEUTROPHILS NFR BLD: 66.1 % (ref 44–72)
NRBC # BLD AUTO: 0 K/UL
NRBC BLD-RTO: 0 /100 WBC (ref 0–0.2)
PLATELET # BLD AUTO: 359 K/UL (ref 164–446)
PMV BLD AUTO: 9.1 FL (ref 9–12.9)
POTASSIUM SERPL-SCNC: 3.7 MMOL/L (ref 3.6–5.5)
PROT SERPL-MCNC: 5.2 G/DL (ref 6–8.2)
RBC # BLD AUTO: 4.1 M/UL (ref 4.2–5.4)
SODIUM SERPL-SCNC: 135 MMOL/L (ref 135–145)
WBC # BLD AUTO: 4.6 K/UL (ref 4.8–10.8)

## 2024-10-15 PROCEDURE — 700111 HCHG RX REV CODE 636 W/ 250 OVERRIDE (IP): Performed by: STUDENT IN AN ORGANIZED HEALTH CARE EDUCATION/TRAINING PROGRAM

## 2024-10-15 PROCEDURE — 85025 COMPLETE CBC W/AUTO DIFF WBC: CPT

## 2024-10-15 PROCEDURE — 36415 COLL VENOUS BLD VENIPUNCTURE: CPT

## 2024-10-15 PROCEDURE — 93010 ELECTROCARDIOGRAM REPORT: CPT | Performed by: INTERNAL MEDICINE

## 2024-10-15 PROCEDURE — 99222 1ST HOSP IP/OBS MODERATE 55: CPT | Mod: AI | Performed by: INTERNAL MEDICINE

## 2024-10-15 PROCEDURE — 99233 SBSQ HOSP IP/OBS HIGH 50: CPT | Mod: 25 | Performed by: STUDENT IN AN ORGANIZED HEALTH CARE EDUCATION/TRAINING PROGRAM

## 2024-10-15 PROCEDURE — 700111 HCHG RX REV CODE 636 W/ 250 OVERRIDE (IP): Mod: JZ | Performed by: HOSPITALIST

## 2024-10-15 PROCEDURE — 770001 HCHG ROOM/CARE - MED/SURG/GYN PRIV*

## 2024-10-15 PROCEDURE — 80053 COMPREHEN METABOLIC PANEL: CPT

## 2024-10-15 PROCEDURE — 99497 ADVNCD CARE PLAN 30 MIN: CPT | Performed by: STUDENT IN AN ORGANIZED HEALTH CARE EDUCATION/TRAINING PROGRAM

## 2024-10-15 PROCEDURE — 93005 ELECTROCARDIOGRAM TRACING: CPT | Performed by: STUDENT IN AN ORGANIZED HEALTH CARE EDUCATION/TRAINING PROGRAM

## 2024-10-15 RX ORDER — DEXTROSE, SODIUM CHLORIDE, SODIUM LACTATE, POTASSIUM CHLORIDE, AND CALCIUM CHLORIDE 5; .6; .31; .03; .02 G/100ML; G/100ML; G/100ML; G/100ML; G/100ML
INJECTION, SOLUTION INTRAVENOUS CONTINUOUS
Status: DISCONTINUED | OUTPATIENT
Start: 2024-10-15 | End: 2024-10-17

## 2024-10-15 RX ORDER — HYDROMORPHONE HYDROCHLORIDE 1 MG/ML
0.5 INJECTION, SOLUTION INTRAMUSCULAR; INTRAVENOUS; SUBCUTANEOUS EVERY 4 HOURS PRN
Status: DISCONTINUED | OUTPATIENT
Start: 2024-10-15 | End: 2024-10-17 | Stop reason: HOSPADM

## 2024-10-15 RX ADMIN — HYDROMORPHONE HYDROCHLORIDE 0.5 MG: 1 INJECTION, SOLUTION INTRAMUSCULAR; INTRAVENOUS; SUBCUTANEOUS at 08:18

## 2024-10-15 RX ADMIN — ONDANSETRON 4 MG: 2 INJECTION INTRAMUSCULAR; INTRAVENOUS at 12:38

## 2024-10-15 RX ADMIN — ONDANSETRON 4 MG: 2 INJECTION INTRAMUSCULAR; INTRAVENOUS at 07:38

## 2024-10-15 RX ADMIN — ONDANSETRON 4 MG: 2 INJECTION INTRAMUSCULAR; INTRAVENOUS at 22:05

## 2024-10-15 ASSESSMENT — PAIN DESCRIPTION - PAIN TYPE
TYPE: ACUTE PAIN
TYPE: ACUTE PAIN

## 2024-10-16 LAB
ALBUMIN SERPL BCP-MCNC: 3.3 G/DL (ref 3.2–4.9)
ALBUMIN/GLOB SERPL: 1.3 G/DL
ALP SERPL-CCNC: 79 U/L (ref 30–99)
ALT SERPL-CCNC: 13 U/L (ref 2–50)
ANION GAP SERPL CALC-SCNC: 11 MMOL/L (ref 7–16)
AST SERPL-CCNC: 20 U/L (ref 12–45)
BILIRUB SERPL-MCNC: 0.6 MG/DL (ref 0.1–1.5)
BUN SERPL-MCNC: 10 MG/DL (ref 8–22)
CALCIUM ALBUM COR SERPL-MCNC: 9.3 MG/DL (ref 8.5–10.5)
CALCIUM SERPL-MCNC: 8.7 MG/DL (ref 8.5–10.5)
CHLORIDE SERPL-SCNC: 100 MMOL/L (ref 96–112)
CO2 SERPL-SCNC: 26 MMOL/L (ref 20–33)
CREAT SERPL-MCNC: 0.55 MG/DL (ref 0.5–1.4)
ERYTHROCYTE [DISTWIDTH] IN BLOOD BY AUTOMATED COUNT: 58 FL (ref 35.9–50)
GFR SERPLBLD CREATININE-BSD FMLA CKD-EPI: 96 ML/MIN/1.73 M 2
GLOBULIN SER CALC-MCNC: 2.5 G/DL (ref 1.9–3.5)
GLUCOSE SERPL-MCNC: 64 MG/DL (ref 65–99)
HCT VFR BLD AUTO: 37.7 % (ref 37–47)
HGB BLD-MCNC: 11.5 G/DL (ref 12–16)
MAGNESIUM SERPL-MCNC: 2 MG/DL (ref 1.5–2.5)
MCH RBC QN AUTO: 25.1 PG (ref 27–33)
MCHC RBC AUTO-ENTMCNC: 30.5 G/DL (ref 32.2–35.5)
MCV RBC AUTO: 82.1 FL (ref 81.4–97.8)
PHOSPHATE SERPL-MCNC: 3 MG/DL (ref 2.5–4.5)
PLATELET # BLD AUTO: 368 K/UL (ref 164–446)
PMV BLD AUTO: 9.4 FL (ref 9–12.9)
POTASSIUM SERPL-SCNC: 3.8 MMOL/L (ref 3.6–5.5)
PROT SERPL-MCNC: 5.8 G/DL (ref 6–8.2)
RBC # BLD AUTO: 4.59 M/UL (ref 4.2–5.4)
SODIUM SERPL-SCNC: 137 MMOL/L (ref 135–145)
TSH SERPL-ACNC: 1.57 UIU/ML (ref 0.35–5.5)
WBC # BLD AUTO: 6.6 K/UL (ref 4.8–10.8)

## 2024-10-16 PROCEDURE — 80053 COMPREHEN METABOLIC PANEL: CPT

## 2024-10-16 PROCEDURE — 84443 ASSAY THYROID STIM HORMONE: CPT

## 2024-10-16 PROCEDURE — 99233 SBSQ HOSP IP/OBS HIGH 50: CPT | Performed by: STUDENT IN AN ORGANIZED HEALTH CARE EDUCATION/TRAINING PROGRAM

## 2024-10-16 PROCEDURE — 83735 ASSAY OF MAGNESIUM: CPT

## 2024-10-16 PROCEDURE — 85027 COMPLETE CBC AUTOMATED: CPT

## 2024-10-16 PROCEDURE — 99232 SBSQ HOSP IP/OBS MODERATE 35: CPT | Performed by: NURSE PRACTITIONER

## 2024-10-16 PROCEDURE — 84100 ASSAY OF PHOSPHORUS: CPT

## 2024-10-16 PROCEDURE — 700111 HCHG RX REV CODE 636 W/ 250 OVERRIDE (IP): Performed by: STUDENT IN AN ORGANIZED HEALTH CARE EDUCATION/TRAINING PROGRAM

## 2024-10-16 PROCEDURE — 700111 HCHG RX REV CODE 636 W/ 250 OVERRIDE (IP): Mod: JZ | Performed by: HOSPITALIST

## 2024-10-16 PROCEDURE — 770006 HCHG ROOM/CARE - MED/SURG/GYN SEMI*

## 2024-10-16 RX ORDER — ACETAMINOPHEN 650 MG/1
650 SUPPOSITORY RECTAL EVERY 4 HOURS PRN
Status: DISCONTINUED | OUTPATIENT
Start: 2024-10-16 | End: 2024-10-17 | Stop reason: HOSPADM

## 2024-10-16 RX ADMIN — THIAMINE HYDROCHLORIDE 100 MG: 100 INJECTION, SOLUTION INTRAMUSCULAR; INTRAVENOUS at 04:47

## 2024-10-16 RX ADMIN — HYDROMORPHONE HYDROCHLORIDE 0.5 MG: 1 INJECTION, SOLUTION INTRAMUSCULAR; INTRAVENOUS; SUBCUTANEOUS at 20:29

## 2024-10-16 RX ADMIN — ONDANSETRON 4 MG: 2 INJECTION INTRAMUSCULAR; INTRAVENOUS at 12:30

## 2024-10-16 ASSESSMENT — ENCOUNTER SYMPTOMS
DIARRHEA: 0
BLOOD IN STOOL: 0
FLANK PAIN: 0
WEIGHT LOSS: 1
NERVOUS/ANXIOUS: 1
FALLS: 0
MYALGIAS: 0
WEAKNESS: 0
HEADACHES: 0
SHORTNESS OF BREATH: 0
ABDOMINAL PAIN: 1
CONSTIPATION: 0
COUGH: 0
CHILLS: 0
NAUSEA: 0
FEVER: 0
INSOMNIA: 0
VOMITING: 0
SORE THROAT: 1

## 2024-10-16 ASSESSMENT — COGNITIVE AND FUNCTIONAL STATUS - GENERAL
DAILY ACTIVITIY SCORE: 24
MOBILITY SCORE: 24
SUGGESTED CMS G CODE MODIFIER DAILY ACTIVITY: CH
SUGGESTED CMS G CODE MODIFIER MOBILITY: CH

## 2024-10-16 ASSESSMENT — PAIN DESCRIPTION - PAIN TYPE
TYPE: ACUTE PAIN

## 2024-10-17 ENCOUNTER — ANESTHESIA (OUTPATIENT)
Dept: SURGERY | Facility: MEDICAL CENTER | Age: 74
DRG: 380 | End: 2024-10-17
Payer: MEDICARE

## 2024-10-17 ENCOUNTER — ANESTHESIA EVENT (OUTPATIENT)
Dept: SURGERY | Facility: MEDICAL CENTER | Age: 74
DRG: 380 | End: 2024-10-17
Payer: MEDICARE

## 2024-10-17 ENCOUNTER — PHARMACY VISIT (OUTPATIENT)
Dept: PHARMACY | Facility: MEDICAL CENTER | Age: 74
End: 2024-10-17
Payer: COMMERCIAL

## 2024-10-17 VITALS
SYSTOLIC BLOOD PRESSURE: 95 MMHG | OXYGEN SATURATION: 100 % | HEIGHT: 60 IN | DIASTOLIC BLOOD PRESSURE: 66 MMHG | BODY MASS INDEX: 11.43 KG/M2 | RESPIRATION RATE: 17 BRPM | WEIGHT: 58.2 LBS | HEART RATE: 73 BPM | TEMPERATURE: 97.3 F

## 2024-10-17 LAB
ALBUMIN SERPL BCP-MCNC: 3.6 G/DL (ref 3.2–4.9)
ALBUMIN/GLOB SERPL: 1.3 G/DL
ALP SERPL-CCNC: 89 U/L (ref 30–99)
ALT SERPL-CCNC: 11 U/L (ref 2–50)
ANION GAP SERPL CALC-SCNC: 14 MMOL/L (ref 7–16)
AST SERPL-CCNC: 24 U/L (ref 12–45)
BILIRUB SERPL-MCNC: 0.5 MG/DL (ref 0.1–1.5)
BUN SERPL-MCNC: 14 MG/DL (ref 8–22)
CALCIUM ALBUM COR SERPL-MCNC: 9 MG/DL (ref 8.5–10.5)
CALCIUM SERPL-MCNC: 8.7 MG/DL (ref 8.5–10.5)
CHLORIDE SERPL-SCNC: 99 MMOL/L (ref 96–112)
CO2 SERPL-SCNC: 24 MMOL/L (ref 20–33)
CREAT SERPL-MCNC: 0.75 MG/DL (ref 0.5–1.4)
ERYTHROCYTE [DISTWIDTH] IN BLOOD BY AUTOMATED COUNT: 57.7 FL (ref 35.9–50)
GFR SERPLBLD CREATININE-BSD FMLA CKD-EPI: 84 ML/MIN/1.73 M 2
GLOBULIN SER CALC-MCNC: 2.8 G/DL (ref 1.9–3.5)
GLUCOSE BLD STRIP.AUTO-MCNC: 57 MG/DL (ref 65–99)
GLUCOSE SERPL-MCNC: 71 MG/DL (ref 65–99)
HCT VFR BLD AUTO: 40.3 % (ref 37–47)
HGB BLD-MCNC: 12 G/DL (ref 12–16)
MAGNESIUM SERPL-MCNC: 2 MG/DL (ref 1.5–2.5)
MCH RBC QN AUTO: 24.5 PG (ref 27–33)
MCHC RBC AUTO-ENTMCNC: 29.8 G/DL (ref 32.2–35.5)
MCV RBC AUTO: 82.4 FL (ref 81.4–97.8)
PATHOLOGY CONSULT NOTE: NORMAL
PHOSPHATE SERPL-MCNC: 3 MG/DL (ref 2.5–4.5)
PLATELET # BLD AUTO: 313 K/UL (ref 164–446)
PMV BLD AUTO: 10 FL (ref 9–12.9)
POTASSIUM SERPL-SCNC: 3.4 MMOL/L (ref 3.6–5.5)
PROT SERPL-MCNC: 6.4 G/DL (ref 6–8.2)
RBC # BLD AUTO: 4.89 M/UL (ref 4.2–5.4)
SODIUM SERPL-SCNC: 137 MMOL/L (ref 135–145)
WBC # BLD AUTO: 7.6 K/UL (ref 4.8–10.8)

## 2024-10-17 PROCEDURE — 80053 COMPREHEN METABOLIC PANEL: CPT

## 2024-10-17 PROCEDURE — 43239 EGD BIOPSY SINGLE/MULTIPLE: CPT | Performed by: INTERNAL MEDICINE

## 2024-10-17 PROCEDURE — 160002 HCHG RECOVERY MINUTES (STAT): Performed by: INTERNAL MEDICINE

## 2024-10-17 PROCEDURE — 160035 HCHG PACU - 1ST 60 MINS PHASE I: Performed by: INTERNAL MEDICINE

## 2024-10-17 PROCEDURE — A9270 NON-COVERED ITEM OR SERVICE: HCPCS | Mod: JZ | Performed by: STUDENT IN AN ORGANIZED HEALTH CARE EDUCATION/TRAINING PROGRAM

## 2024-10-17 PROCEDURE — 160009 HCHG ANES TIME/MIN: Performed by: INTERNAL MEDICINE

## 2024-10-17 PROCEDURE — 88305 TISSUE EXAM BY PATHOLOGIST: CPT

## 2024-10-17 PROCEDURE — 99239 HOSP IP/OBS DSCHRG MGMT >30: CPT | Performed by: STUDENT IN AN ORGANIZED HEALTH CARE EDUCATION/TRAINING PROGRAM

## 2024-10-17 PROCEDURE — 700111 HCHG RX REV CODE 636 W/ 250 OVERRIDE (IP): Performed by: STUDENT IN AN ORGANIZED HEALTH CARE EDUCATION/TRAINING PROGRAM

## 2024-10-17 PROCEDURE — 700111 HCHG RX REV CODE 636 W/ 250 OVERRIDE (IP): Performed by: ANESTHESIOLOGY

## 2024-10-17 PROCEDURE — 85027 COMPLETE CBC AUTOMATED: CPT

## 2024-10-17 PROCEDURE — RXMED WILLOW AMBULATORY MEDICATION CHARGE: Performed by: STUDENT IN AN ORGANIZED HEALTH CARE EDUCATION/TRAINING PROGRAM

## 2024-10-17 PROCEDURE — 700102 HCHG RX REV CODE 250 W/ 637 OVERRIDE(OP): Mod: JZ | Performed by: STUDENT IN AN ORGANIZED HEALTH CARE EDUCATION/TRAINING PROGRAM

## 2024-10-17 PROCEDURE — 160203 HCHG ENDO MINUTES - 1ST 30 MINS LEVEL 4: Performed by: INTERNAL MEDICINE

## 2024-10-17 PROCEDURE — 88312 SPECIAL STAINS GROUP 1: CPT

## 2024-10-17 PROCEDURE — 83735 ASSAY OF MAGNESIUM: CPT

## 2024-10-17 PROCEDURE — 160048 HCHG OR STATISTICAL LEVEL 1-5: Performed by: INTERNAL MEDICINE

## 2024-10-17 PROCEDURE — 84100 ASSAY OF PHOSPHORUS: CPT

## 2024-10-17 PROCEDURE — 0DB68ZX EXCISION OF STOMACH, VIA NATURAL OR ARTIFICIAL OPENING ENDOSCOPIC, DIAGNOSTIC: ICD-10-PCS | Performed by: INTERNAL MEDICINE

## 2024-10-17 PROCEDURE — 82962 GLUCOSE BLOOD TEST: CPT

## 2024-10-17 PROCEDURE — 700101 HCHG RX REV CODE 250: Performed by: ANESTHESIOLOGY

## 2024-10-17 PROCEDURE — 700111 HCHG RX REV CODE 636 W/ 250 OVERRIDE (IP): Performed by: HOSPITALIST

## 2024-10-17 RX ORDER — HYDROCODONE BITARTRATE AND ACETAMINOPHEN 7.5; 325 MG/15ML; MG/15ML
30 SOLUTION ORAL
Status: DISCONTINUED | OUTPATIENT
Start: 2024-10-17 | End: 2024-10-17 | Stop reason: HOSPADM

## 2024-10-17 RX ORDER — LABETALOL HYDROCHLORIDE 5 MG/ML
5 INJECTION, SOLUTION INTRAVENOUS
Status: DISCONTINUED | OUTPATIENT
Start: 2024-10-17 | End: 2024-10-17 | Stop reason: HOSPADM

## 2024-10-17 RX ORDER — POTASSIUM CHLORIDE 1500 MG/1
40 TABLET, EXTENDED RELEASE ORAL EVERY 4 HOURS
Status: COMPLETED | OUTPATIENT
Start: 2024-10-17 | End: 2024-10-17

## 2024-10-17 RX ORDER — HYDRALAZINE HYDROCHLORIDE 20 MG/ML
5 INJECTION INTRAMUSCULAR; INTRAVENOUS
Status: DISCONTINUED | OUTPATIENT
Start: 2024-10-17 | End: 2024-10-17 | Stop reason: HOSPADM

## 2024-10-17 RX ORDER — EPHEDRINE SULFATE 50 MG/ML
5 INJECTION, SOLUTION INTRAVENOUS
Status: DISCONTINUED | OUTPATIENT
Start: 2024-10-17 | End: 2024-10-17 | Stop reason: HOSPADM

## 2024-10-17 RX ORDER — DIPHENHYDRAMINE HYDROCHLORIDE 50 MG/ML
12.5 INJECTION INTRAMUSCULAR; INTRAVENOUS
Status: DISCONTINUED | OUTPATIENT
Start: 2024-10-17 | End: 2024-10-17 | Stop reason: HOSPADM

## 2024-10-17 RX ORDER — MEPERIDINE HYDROCHLORIDE 25 MG/ML
12.5 INJECTION INTRAMUSCULAR; INTRAVENOUS; SUBCUTANEOUS
Status: DISCONTINUED | OUTPATIENT
Start: 2024-10-17 | End: 2024-10-17 | Stop reason: HOSPADM

## 2024-10-17 RX ORDER — HALOPERIDOL 5 MG/ML
1 INJECTION INTRAMUSCULAR
Status: DISCONTINUED | OUTPATIENT
Start: 2024-10-17 | End: 2024-10-17 | Stop reason: HOSPADM

## 2024-10-17 RX ORDER — METOPROLOL TARTRATE 1 MG/ML
1 INJECTION, SOLUTION INTRAVENOUS
Status: DISCONTINUED | OUTPATIENT
Start: 2024-10-17 | End: 2024-10-17 | Stop reason: HOSPADM

## 2024-10-17 RX ORDER — HYDROCODONE BITARTRATE AND ACETAMINOPHEN 7.5; 325 MG/15ML; MG/15ML
15 SOLUTION ORAL
Status: DISCONTINUED | OUTPATIENT
Start: 2024-10-17 | End: 2024-10-17 | Stop reason: HOSPADM

## 2024-10-17 RX ORDER — ONDANSETRON 2 MG/ML
4 INJECTION INTRAMUSCULAR; INTRAVENOUS
Status: DISCONTINUED | OUTPATIENT
Start: 2024-10-17 | End: 2024-10-17 | Stop reason: HOSPADM

## 2024-10-17 RX ORDER — LIDOCAINE HYDROCHLORIDE 20 MG/ML
INJECTION, SOLUTION EPIDURAL; INFILTRATION; INTRACAUDAL; PERINEURAL PRN
Status: DISCONTINUED | OUTPATIENT
Start: 2024-10-17 | End: 2024-10-17 | Stop reason: SURG

## 2024-10-17 RX ORDER — IPRATROPIUM BROMIDE AND ALBUTEROL SULFATE 2.5; .5 MG/3ML; MG/3ML
3 SOLUTION RESPIRATORY (INHALATION)
Status: DISCONTINUED | OUTPATIENT
Start: 2024-10-17 | End: 2024-10-17 | Stop reason: HOSPADM

## 2024-10-17 RX ADMIN — LIDOCAINE HYDROCHLORIDE 20 MG: 20 INJECTION, SOLUTION EPIDURAL; INFILTRATION; INTRACAUDAL at 08:29

## 2024-10-17 RX ADMIN — ONDANSETRON 4 MG: 2 INJECTION INTRAMUSCULAR; INTRAVENOUS at 04:24

## 2024-10-17 RX ADMIN — THIAMINE HYDROCHLORIDE 100 MG: 100 INJECTION, SOLUTION INTRAMUSCULAR; INTRAVENOUS at 04:24

## 2024-10-17 RX ADMIN — HYDROMORPHONE HYDROCHLORIDE 0.5 MG: 1 INJECTION, SOLUTION INTRAMUSCULAR; INTRAVENOUS; SUBCUTANEOUS at 04:59

## 2024-10-17 RX ADMIN — POTASSIUM CHLORIDE 40 MEQ: 1500 TABLET, EXTENDED RELEASE ORAL at 14:26

## 2024-10-17 RX ADMIN — PROPOFOL 80 MG: 10 INJECTION, EMULSION INTRAVENOUS at 08:29

## 2024-10-17 RX ADMIN — FENTANYL CITRATE 50 MCG: 50 INJECTION, SOLUTION INTRAMUSCULAR; INTRAVENOUS at 08:26

## 2024-10-17 RX ADMIN — POTASSIUM CHLORIDE 40 MEQ: 1500 TABLET, EXTENDED RELEASE ORAL at 11:50

## 2024-10-17 ASSESSMENT — PAIN DESCRIPTION - PAIN TYPE
TYPE: SURGICAL PAIN
TYPE: ACUTE PAIN
TYPE: ACUTE PAIN
TYPE: SURGICAL PAIN
TYPE: SURGICAL PAIN

## 2024-10-17 ASSESSMENT — FIBROSIS 4 INDEX: FIB4 SCORE: 1.29

## 2024-10-17 ASSESSMENT — PAIN SCALES - GENERAL: PAIN_LEVEL: 0

## (undated) DEVICE — TUBING CLEARLINK DUO-VENT - C-FLO (48EA/CA)

## (undated) DEVICE — SET LEADWIRE 5 LEAD BEDSIDE DISPOSABLE ECG (1SET OF 5/EA)

## (undated) DEVICE — LACTATED RINGERS INJ 1000 ML - (14EA/CA 60CA/PF)

## (undated) DEVICE — SODIUM CHL IRRIGATION 0.9% 1000ML (12EA/CA)

## (undated) DEVICE — PORT AUXILLARY WATER (50EA/BX)

## (undated) DEVICE — KIT CUSTOM PROCEDURE SINGLE FOR ENDO (15/CA)

## (undated) DEVICE — CONTAINER, SPECIMEN, STERILE

## (undated) DEVICE — FILM CASSETTE ENDO

## (undated) DEVICE — BLOCK BITE MAXI DENTAL RETENTION RIM (100EA/BX)

## (undated) DEVICE — SET EXTENSION WITH 2 PORTS (48EA/CA) ***PART #2C8610 IS A SUBSTITUTE*****

## (undated) DEVICE — WATER IRRIGATION STERILE 1000ML (12EA/CA)

## (undated) DEVICE — FORCEP RADIAL JAW 4 STANDARD CAPACITY W/NEEDLE 240CM (40EA/BX)

## (undated) DEVICE — NEPTUNE 4 PORT MANIFOLD - (20/PK)

## (undated) DEVICE — SENSOR OXIMETER ADULT SPO2 RD SET (20EA/BX)

## (undated) DEVICE — ELECTRODE 850 FOAM ADHESIVE - HYDROGEL RADIOTRNSPRNT (50/PK)

## (undated) DEVICE — MASK PANORAMIC OXYGEN PRO2 (30EA/CA)

## (undated) DEVICE — TOWEL STOP TIMEOUT SAFETY FLAG (40EA/CA)

## (undated) DEVICE — TUBE CONNECTING SUCTION - CLEAR PLASTIC STERILE 72 IN (50EA/CA)

## (undated) DEVICE — BUTTON ENDOSCOPY DISPOSABLE